# Patient Record
Sex: FEMALE | Race: WHITE | NOT HISPANIC OR LATINO | Employment: FULL TIME | URBAN - METROPOLITAN AREA
[De-identification: names, ages, dates, MRNs, and addresses within clinical notes are randomized per-mention and may not be internally consistent; named-entity substitution may affect disease eponyms.]

---

## 2015-10-20 LAB — HM PAP SMEAR: NORMAL

## 2021-11-12 NOTE — PROGRESS NOTES
OB CONFIRMATION APPOINTMENT    CC- Pt has had a menstrual irregularity    Chief Complaint   Patient presents with   • Amenorrhea        HISTORY OF PRESENT ILLNESS:    STEFANIE Cortés is being seen today to confirm she is pregnant.    She is a 31 y.o.  Patient's last menstrual period was 2021 (exact date)..  EDC= 22.  Gestational age is 11wks     Current obstetric complaints : nausea     Prior obstetric issues, potential pregnancy concerns: subutex maintenance therapy    Family history of genetic issues (includes FOB): none    OFFERED GENETIC TESTING: CfDNA, Cystic fibrosis, Spinal muscular atrophy, Fragile X syndrome: yes    Prior infections concerning in pregnancy (Rash, fever in last 2 weeks): none    HISTORY REVIEWED:      Past Medical History:   Diagnosis Date   • HPV (human papilloma virus) infection 2008   • LGSIL on Pap smear of cervix 2008   • Ovarian cyst        History reviewed. No pertinent surgical history.      Current Outpatient Medications:   •  buprenorphine (SUBUTEX) 8 MG sublingual tablet SL tablet, TAKE 2 TABLET BELOW THE TONGUE DAILY (DISSOLVE UNDER TONGUE-DO NOT SWALLOW), Disp: , Rfl:   •  gabapentin (NEURONTIN) 800 MG tablet, Take 800 mg by mouth 3 (Three) Times a Day., Disp: , Rfl:   •  SUMAtriptan (IMITREX) 50 MG tablet, Take 50 mg by mouth., Disp: , Rfl:   •  amoxicillin-clavulanate (Augmentin) 875-125 MG per tablet, Take 1 tablet by mouth 2 (Two) Times a Day., Disp: 20 tablet, Rfl: 0  •  buprenorphine-naloxone (SUBOXONE) 8-2 MG per SL tablet, TAKE 2 TABLET BELOW THE TONGUE DAILY (DISSOLVE UNDER TONGUE-DO NOT SWALLOW), Disp: , Rfl:   •  cyclobenzaprine (FLEXERIL) 10 MG tablet, Take 10 mg by mouth., Disp: , Rfl:   •  cyclobenzaprine (FLEXERIL) 10 MG tablet, Take 10 mg by mouth., Disp: , Rfl:   •  ibuprofen (ADVIL,MOTRIN) 400 MG tablet, Take 400 mg by mouth., Disp: , Rfl:     Allergies   Allergen Reactions   • Benadryl [Diphenhydramine]        Social History  "    Socioeconomic History   • Marital status:    Tobacco Use   • Smoking status: Current Every Day Smoker     Packs/day: 1.00   • Smokeless tobacco: Never Used   Vaping Use   • Vaping Use: Never used   Substance and Sexual Activity   • Alcohol use: No   • Drug use: No   • Sexual activity: Yes     Partners: Male       Family History   Problem Relation Age of Onset   • Hypertension Father        REVIEW OF SYSTEMS:     Review of Systems   Constitutional: Negative.    HENT: Negative.    Respiratory: Negative.    Cardiovascular: Negative.    Gastrointestinal: Negative.    Endocrine: Negative.    Genitourinary: Positive for menstrual problem.   Musculoskeletal: Negative.    Skin: Negative.    Allergic/Immunologic: Negative.    Neurological: Negative.    Hematological: Negative.    Psychiatric/Behavioral: Negative.        Physical Exam     Physical Exam  Vitals and nursing note reviewed.   Constitutional:       Appearance: She is well-developed.   HENT:      Head: Normocephalic and atraumatic.   Cardiovascular:      Rate and Rhythm: Normal rate.   Pulmonary:      Effort: Pulmonary effort is normal.   Abdominal:      General: There is no distension.      Palpations: Abdomen is soft. There is no mass.      Tenderness: There is no abdominal tenderness. There is no guarding.   Genitourinary:     Vagina: No vaginal discharge.   Musculoskeletal:         General: No tenderness or deformity. Normal range of motion.      Cervical back: Normal range of motion.   Skin:     General: Skin is warm and dry.      Coloration: Skin is not pale.      Findings: No erythema or rash.   Neurological:      Mental Status: She is alert and oriented to person, place, and time.   Psychiatric:         Behavior: Behavior normal.         Thought Content: Thought content normal.         Judgment: Judgment normal.             Objective    /84   Ht 157.5 cm (62.01\")   Wt 52 kg (114 lb 9.6 oz)   LMP 08/29/2021 (Exact Date)   Breastfeeding " No   BMI 20.96 kg/m²     Rema Cortés  reports that she has been smoking. She has been smoking about 1.00 pack per day. She has never used smokeless tobacco.. I have educated her on the risk of diseases from using tobacco products such as cancer, COPD and heart disease.     I advised her to quit : PT COUNSELED TO QUIT SMOKING IN PREGNANCY.  (Cigarette smoking is associated with increased incidence of IUGR, PROM, PTL, PTD, SIDS, asthma, and ear infections.  . Smoking cessation is the single most important thing she can do to improve the pregnancy outcome.)        U/s:  Normal except for ABNORMAL NUCHAL TRANSLUCENCY         Assessment    1) Pregnancy at Unknown   Diagnoses and all orders for this visit:    1. Missed menses (Primary)  -     POC Urinalysis Dipstick  -     POC Pregnancy, Urine    2. Migraine with aura and without status migrainosus, not intractable    3. Pregnancy complicated by subutex maintenance, antepartum (HCC)    4. Cigarette smoker    5. Nuchal fold thickening determined by ultrasound: awaiting nips         Plan    Patient is on Prenatal vitamins  Problem list reviewed and updated.  Reviewed routine prenatal care with the patient  Zika (travel restrictions/ok to use insect repellant), not to changing cat litter, food restrictions, avoidance of alcohol, tobacco and drugs and saunas/hot tubs.   All questions answered.     Counseling was given to patient and family for the following topics: diagnostic results, instructions for management, risk factor reductions, prognosis, patient and family education, impressions, risks and benefits of treatment options and importance of treatment compliance . I spent 45 minutes caring for Rema on this date of service. This time includes time spent by me in the following activities: preparing for the visit, reviewing tests, obtaining and/or reviewing a separately obtained history, performing a medically appropriate examination and/or evaluation, counseling and  educating the patient/family/caregiver, ordering medications, tests, or procedures, referring and communicating with other health care professionals, documenting information in the medical record, independently interpreting results and communicating that information with the patient/family/caregiver, care coordination and subutex maintenance therapy and neurontin use in pregnancy.        RTO Return in about 2 weeks (around 11/29/2021) for ob phys w/ pap.    Imtiaz Campos MD    11/15/2021  12:05 EST

## 2021-11-15 ENCOUNTER — OFFICE VISIT (OUTPATIENT)
Dept: OBSTETRICS AND GYNECOLOGY | Facility: CLINIC | Age: 31
End: 2021-11-15

## 2021-11-15 VITALS
BODY MASS INDEX: 21.09 KG/M2 | SYSTOLIC BLOOD PRESSURE: 112 MMHG | HEIGHT: 62 IN | WEIGHT: 114.6 LBS | DIASTOLIC BLOOD PRESSURE: 84 MMHG

## 2021-11-15 DIAGNOSIS — F17.210 CIGARETTE SMOKER: ICD-10-CM

## 2021-11-15 DIAGNOSIS — N92.6 MISSED MENSES: Primary | ICD-10-CM

## 2021-11-15 DIAGNOSIS — F11.20 PREGNANCY COMPLICATED BY SUBUTEX MAINTENANCE, ANTEPARTUM (HCC): ICD-10-CM

## 2021-11-15 DIAGNOSIS — IMO0002 NUCHAL FOLD THICKENING DETERMINED BY ULTRASOUND: ICD-10-CM

## 2021-11-15 DIAGNOSIS — G43.109 MIGRAINE WITH AURA AND WITHOUT STATUS MIGRAINOSUS, NOT INTRACTABLE: ICD-10-CM

## 2021-11-15 DIAGNOSIS — O99.320 PREGNANCY COMPLICATED BY SUBUTEX MAINTENANCE, ANTEPARTUM (HCC): ICD-10-CM

## 2021-11-15 LAB
B-HCG UR QL: POSITIVE
BILIRUB BLD-MCNC: NEGATIVE MG/DL
CLARITY, POC: CLEAR
COLOR UR: YELLOW
EXPIRATION DATE: ABNORMAL
GLUCOSE UR STRIP-MCNC: NEGATIVE MG/DL
INTERNAL NEGATIVE CONTROL: NEGATIVE
INTERNAL POSITIVE CONTROL: POSITIVE
KETONES UR QL: NEGATIVE
LEUKOCYTE EST, POC: NEGATIVE
Lab: 55
NITRITE UR-MCNC: NEGATIVE MG/ML
PH UR: 5 [PH] (ref 5–8)
PROT UR STRIP-MCNC: NEGATIVE MG/DL
RBC # UR STRIP: NEGATIVE /UL
SP GR UR: 1 (ref 1–1.03)
UROBILINOGEN UR QL: NORMAL

## 2021-11-15 PROCEDURE — 99204 OFFICE O/P NEW MOD 45 MIN: CPT | Performed by: OBSTETRICS & GYNECOLOGY

## 2021-11-15 PROCEDURE — 81002 URINALYSIS NONAUTO W/O SCOPE: CPT | Performed by: OBSTETRICS & GYNECOLOGY

## 2021-11-15 PROCEDURE — 81025 URINE PREGNANCY TEST: CPT | Performed by: OBSTETRICS & GYNECOLOGY

## 2021-11-15 RX ORDER — BUPRENORPHINE HYDROCHLORIDE 8 MG/1
TABLET SUBLINGUAL
COMMUNITY
Start: 2021-11-09

## 2021-11-15 RX ORDER — CYCLOBENZAPRINE HCL 10 MG
10 TABLET ORAL
COMMUNITY
Start: 2021-09-08 | End: 2021-12-03

## 2021-12-03 ENCOUNTER — ROUTINE PRENATAL (OUTPATIENT)
Dept: OBSTETRICS AND GYNECOLOGY | Facility: CLINIC | Age: 31
End: 2021-12-03

## 2021-12-03 VITALS — BODY MASS INDEX: 20.92 KG/M2 | SYSTOLIC BLOOD PRESSURE: 114 MMHG | DIASTOLIC BLOOD PRESSURE: 82 MMHG | WEIGHT: 114.4 LBS

## 2021-12-03 DIAGNOSIS — IMO0002 NUCHAL FOLD THICKENING DETERMINED BY ULTRASOUND: ICD-10-CM

## 2021-12-03 DIAGNOSIS — Z11.51 SCREENING FOR HUMAN PAPILLOMAVIRUS: ICD-10-CM

## 2021-12-03 DIAGNOSIS — Z01.419 PAP SMEAR, LOW-RISK: ICD-10-CM

## 2021-12-03 DIAGNOSIS — Z36.9 ENCOUNTER FOR ANTENATAL SCREENING, UNSPECIFIED: Primary | ICD-10-CM

## 2021-12-03 LAB
C TRACH RRNA SPEC DONR QL NAA+PROBE: NORMAL
EXTERNAL CYSTIC FIBROSIS: NORMAL
GLUCOSE UR STRIP-MCNC: NEGATIVE MG/DL
N GONORRHOEA DNA SPEC QL NAA+PROBE: NORMAL
PROT UR STRIP-MCNC: NEGATIVE MG/DL

## 2021-12-03 PROCEDURE — 0501F PRENATAL FLOW SHEET: CPT | Performed by: NURSE PRACTITIONER

## 2021-12-03 NOTE — PROGRESS NOTES
Initial ob visit     Chief Complaint   Patient presents with   • Initial Prenatal Visit       Rema Cortés is being seen today for her first obstetrical visit.  She is a 31 y.o.  @ 13w5d gestation. This problem is new to me: yes      # 1 - Date: None, Sex: None, Weight: None, GA: None, Delivery: None, Apgar1: None, Apgar5: None, Living: None, Birth Comments: None      LNMP: 21  Confident with date: Yes  Taking prenatal vitamins: Yes  Planned pregnancy: Yes  Prior obstetric issues, potential pregnancy concerns: first pregnancy   Family history of genetic issues (includes FOB): denies  Varicella Hx: as child  Flu vaccine: 3/21   COVID Vaccine: S/P COVID vaccine + booster  History of STDs: denies    Current medications: PNV, Subutex- 8mg BID,   Last pap smear: uncertain   Smoker: Yes, Approx 1 ppd but has cut down to 1/2 ppd   Drug or alcohol abuse: Yes  H/O Physical, mental or sexual abuse: denies    H/O mental health disorder: H/O depressed and bipolar   Prior testing for Cystic Fibrosis Carrier or Sickle Cell Trait- has not had   Prepregnancy BMI: Body mass index is 20.92 kg/m².      Past Medical History:   Diagnosis Date   • HPV (human papilloma virus) infection 2008   • LGSIL on Pap smear of cervix 2008   • Ovarian cyst        No past surgical history on file.      Current Outpatient Medications:   •  buprenorphine (SUBUTEX) 8 MG sublingual tablet SL tablet, TAKE 2 TABLET BELOW THE TONGUE DAILY (DISSOLVE UNDER TONGUE-DO NOT SWALLOW), Disp: , Rfl:   •  gabapentin (NEURONTIN) 800 MG tablet, Take 800 mg by mouth 3 (Three) Times a Day., Disp: , Rfl:   •  SUMAtriptan (IMITREX) 50 MG tablet, Take 50 mg by mouth., Disp: , Rfl:     Allergies   Allergen Reactions   • Benadryl [Diphenhydramine]        Social History     Socioeconomic History   • Marital status:    Tobacco Use   • Smoking status: Current Every Day Smoker     Packs/day: 1.00   • Smokeless tobacco: Never Used   Vaping Use   •  Vaping Use: Never used   Substance and Sexual Activity   • Alcohol use: No   • Drug use: No   • Sexual activity: Yes     Partners: Male       Family History   Problem Relation Age of Onset   • Hypertension Father        Review of systems     All other systems reviewed and are negative except for: Constitutional: positive for fatigue     Objective    /82   Wt 51.9 kg (114 lb 6.4 oz)   LMP 08/29/2021 (Exact Date)   BMI 20.92 kg/m²       General Appearance:    Alert, cooperative, in no acute distress, habitus normal    Head:    Not examined   Eyes:           Not examined   Ears:  Not examined       Neck:  No thyroid enlargement or nodules present   Back:     No kyphosis present, no scoliosis present,                       Lungs:     Clear to auscultation,respirations regular, even and                   unlabored    Heart:    Regular rhythm and normal rate, normal S1 and S2, no            murmur, no gallop, no rub, no click   Breast Exam:    No masses, No nipple discharge   Abdomen:     Normal bowel sounds, no masses, no organomegaly, soft        non-tender, non-distended, no guarding, no rebound                 tenderness   Genitalia:    Vulva - No masses, no atrophy, no lesions    Vagina - No discharge, No bleeding    Cervix - No Lesions, closed. Pap collected:Yes     Uterus - Consistent with 13 weeks.     Adnexa - No masses, non tender       Extremities:   Moves all extremities well, no edema, no cyanosis, no              redness       Skin:   No bleeding, bruising or rash       Neurologic:   Sensation intact, A&O times 3      Assessment/Plan    1) Pregnancy at 13w5d- US IMP: Single, viable IUP @ 13.5 weeks. Nuchal thickeness 0.2cm.  US findings discussed with patient. EDC established 6/5/21 and confirmed by US and LNMP.     2) OB exam: OB exam completed: Yes. New OB bag provided Yes. Pap collected: Yes.    3) Labs: OB labs collected: Yes Counseled on genetic screening: Yes, she desires CF, SMA, and FX.  Counseled on Quad screen and AFP: No, she is too early for AFP. Counseled on NIPS: Yes, she desires NIPS.      4) Patient's Body mass index is 20.92 kg/m². indicating that she is within normal range (BMI 18.5-24.9). No BMI management plan needed. For women with a normal weight, with a BMI between 18.5-24.5 before pregnancy, the recommended weight gain is 25-35 pounds for the entire pregnancy       5)  Prenatal care: Oriented to the office and prenatal care. Encourage prenatal vitamins. Disc Tylenol products are fine, avoid aspirin and ibuprofen; Zika (travel restrictions/ok to use insect repellant); not to change cat litter; food restrictions; exercise;  avoidance of alcohol, tobacco, drugs and saunas/hot tubs.     6) COVID19 precautions were reviewed with the patient. Continue to encourage social distancing, wearing a mask, and good hand hygiene.  I wore a mask, protective eye wear, and gloves during this patient encounter.  Patient also wearing a surgical mask and social distancing was observed. Hand hygeine performed before and after seeing the patient. Info provided on Covid vaccine: s/p covid vaccine and booster     7) Flu vaccine- Encouraged the flu vaccine during pregnancy. Discuss normal physiological changes during pregnancy increase the susceptibility of the flu virus and increase the risk of severe illness for the pregnant woman. Disc flu can be harmful to the unborn baby as well. Enc the flu vaccine. Disc with patient that getting the flu vaccine is the first and most important step in protecting against the flu. Flu vaccines given during pregnancy help protect both the mother and the baby. Getting the flu vaccine during pregnancy also helps protect the  from flu illness for several months after their birth, when then are too young to get vaccinated. Also disc the importance of good hand hygiene and avoiding people who are sick. The patient is undecided  the flu vaccine.     8) Thickened nuchal  fold- Check NIPS today. Nuchal translucency 0.2cm today compared to 0.28 @ 6 weeks     9) Subutex- Prescribed 8 mg BID. Managed by Dr. Puckett. Disc MAGNUS and prolonged stay     10)Smoker- Approx < 1/2 ppd.  During this visit, approx 3-5 minutes counseling the patient regarding smoking cessation. PT COUNSELED TO QUIT SMOKING IN PREGNANCY.  She has been informed that cigarette smoking is associated with increased incidence of  birth, growth restriction, SIDS, et. Disc that smoking cessation is the single most important thing she can do to improve the pregnancy outcome.  She verbalized understanding to this discussion.      11) H/O depression and bipolar- No current meds.      All questions answered.     RTO 4 weeks     Leticia Rao, MARK  12/3/2021  11:43 EST

## 2021-12-04 LAB
ABO GROUP BLD: NORMAL
BASOPHILS # BLD AUTO: 0 X10E3/UL (ref 0–0.2)
BASOPHILS NFR BLD AUTO: 0 %
BLD GP AB SCN SERPL QL: NEGATIVE
EOSINOPHIL # BLD AUTO: 0.1 X10E3/UL (ref 0–0.4)
EOSINOPHIL NFR BLD AUTO: 1 %
ERYTHROCYTE [DISTWIDTH] IN BLOOD BY AUTOMATED COUNT: 12.9 % (ref 11.7–15.4)
HBA1C MFR BLD: 5.2 % (ref 4.8–5.6)
HBV SURFACE AG SERPL QL IA: NEGATIVE
HCT VFR BLD AUTO: 38.4 % (ref 34–46.6)
HCV AB S/CO SERPL IA: <0.1 S/CO RATIO (ref 0–0.9)
HGB BLD-MCNC: 12.8 G/DL (ref 11.1–15.9)
HIV 1+2 AB+HIV1 P24 AG SERPL QL IA: NON REACTIVE
IMM GRANULOCYTES # BLD AUTO: 0 X10E3/UL (ref 0–0.1)
IMM GRANULOCYTES NFR BLD AUTO: 0 %
LYMPHOCYTES # BLD AUTO: 2.7 X10E3/UL (ref 0.7–3.1)
LYMPHOCYTES NFR BLD AUTO: 30 %
MCH RBC QN AUTO: 30.7 PG (ref 26.6–33)
MCHC RBC AUTO-ENTMCNC: 33.3 G/DL (ref 31.5–35.7)
MCV RBC AUTO: 92 FL (ref 79–97)
MONOCYTES # BLD AUTO: 0.5 X10E3/UL (ref 0.1–0.9)
MONOCYTES NFR BLD AUTO: 5 %
NEUTROPHILS # BLD AUTO: 5.7 X10E3/UL (ref 1.4–7)
NEUTROPHILS NFR BLD AUTO: 64 %
PLATELET # BLD AUTO: 252 X10E3/UL (ref 150–450)
RBC # BLD AUTO: 4.17 X10E6/UL (ref 3.77–5.28)
RH BLD: POSITIVE
RPR SER QL: NON REACTIVE
RUBV IGG SERPL IA-ACNC: 2.07 INDEX
VZV IGG SER IA-ACNC: 530 INDEX
WBC # BLD AUTO: 8.9 X10E3/UL (ref 3.4–10.8)

## 2021-12-07 LAB
C TRACH RRNA CVX QL NAA+PROBE: NEGATIVE
CYTOLOGIST CVX/VAG CYTO: NORMAL
CYTOLOGY CVX/VAG DOC CYTO: NORMAL
CYTOLOGY CVX/VAG DOC THIN PREP: NORMAL
DX ICD CODE: NORMAL
HIV 1 & 2 AB SER-IMP: NORMAL
HPV I/H RISK 4 DNA CVX QL PROBE+SIG AMP: NEGATIVE
N GONORRHOEA RRNA CVX QL NAA+PROBE: NEGATIVE
OTHER STN SPEC: NORMAL
STAT OF ADQ CVX/VAG CYTO-IMP: NORMAL
T VAGINALIS RRNA SPEC QL NAA+PROBE: NEGATIVE

## 2021-12-08 LAB
AMPHETAMINES UR QL SCN: NEGATIVE NG/ML
BACTERIA UR CULT: NORMAL
BACTERIA UR CULT: NORMAL
BARBITURATES UR QL SCN: NEGATIVE NG/ML
BENZODIAZ UR QL SCN: NEGATIVE NG/ML
BZE UR QL SCN: NEGATIVE NG/ML
CANNABINOIDS UR QL SCN: NEGATIVE NG/ML
CREAT UR-MCNC: 109.8 MG/DL (ref 20–300)
LABORATORY COMMENT REPORT: NORMAL
METHADONE UR QL SCN: NEGATIVE NG/ML
OPIATES UR QL SCN: NEGATIVE NG/ML
OXYCODONE+OXYMORPHONE UR QL SCN: NEGATIVE NG/ML
PCP UR QL: NEGATIVE NG/ML
PH UR: 5.7 [PH] (ref 4.5–8.9)
PROPOXYPH UR QL SCN: NEGATIVE NG/ML

## 2021-12-09 LAB
CFDNA.FET/CFDNA.TOTAL SFR FETUS: NORMAL %
CITATION REF LAB TEST: NORMAL
FET 13+18+21+X+Y ANEUP PLAS.CFDNA: NEGATIVE
FET CHR 21 TS PLAS.CFDNA QL: NEGATIVE
FET SEX PLAS.CFDNA DOSAGE CFDNA: NORMAL
FET TS 13 RISK PLAS.CFDNA QL: NEGATIVE
FET TS 18 RISK WBC.DNA+CFDNA QL: NEGATIVE
GA EST FROM CONCEPTION DATE: NORMAL D
GESTATIONAL AGE > 9:: YES
LAB DIRECTOR NAME PROVIDER: NORMAL
LAB DIRECTOR NAME PROVIDER: NORMAL
LABORATORY COMMENT REPORT: NORMAL
LIMITATIONS OF THE TEST: NORMAL
NEGATIVE PREDICTIVE VALUE: NORMAL
NOTE: NORMAL
PERFORMANCE CHARACTERISTICS: NORMAL
POSITIVE PREDICTIVE VALUE: NORMAL
REF LAB TEST METHOD: NORMAL
TEST PERFORMANCE INFO SPEC: NORMAL

## 2021-12-10 LAB
CLINICAL GENETICS COUNSELING NOTE: NORMAL
CLINICAL INFO: NORMAL
CYSTIC FIBROSIS MUTATION 97: NORMAL
ETHNIC BACKGROUND STATED: NORMAL
FMR1 GENE CGG RPT BLD/T QL: NORMAL
GENE DIS ANL CARRIER INTERP-IMP: NORMAL
LAB DIRECTOR NAME PROVIDER: NORMAL
LABORATORY COMMENT REPORT: NORMAL
LABORATORY COMMENT REPORT: NORMAL
REASON FOR REFERRAL (NARRATIVE): NORMAL
REF LAB TEST METHOD: NORMAL
SMN1 GENE MUT ANL BLD/T: NORMAL
SPECIMEN SOURCE: NORMAL
TEST PERFORMANCE INFO SPEC: NORMAL
TEST PERFORMANCE INFO SPEC: NORMAL

## 2021-12-30 ENCOUNTER — ROUTINE PRENATAL (OUTPATIENT)
Dept: OBSTETRICS AND GYNECOLOGY | Facility: CLINIC | Age: 31
End: 2021-12-30

## 2021-12-30 VITALS — WEIGHT: 116.8 LBS | DIASTOLIC BLOOD PRESSURE: 72 MMHG | SYSTOLIC BLOOD PRESSURE: 112 MMHG | BODY MASS INDEX: 21.36 KG/M2

## 2021-12-30 DIAGNOSIS — Z34.92 NORMAL PREGNANCY IN SECOND TRIMESTER: Primary | ICD-10-CM

## 2021-12-30 LAB
AFP INTERP SERPL-IMP: NORMAL
GLUCOSE UR STRIP-MCNC: NEGATIVE MG/DL
PROT UR STRIP-MCNC: NEGATIVE MG/DL

## 2021-12-30 PROCEDURE — 0502F SUBSEQUENT PRENATAL CARE: CPT | Performed by: OBSTETRICS & GYNECOLOGY

## 2022-01-25 ENCOUNTER — ROUTINE PRENATAL (OUTPATIENT)
Dept: OBSTETRICS AND GYNECOLOGY | Facility: CLINIC | Age: 32
End: 2022-01-25

## 2022-01-25 VITALS — SYSTOLIC BLOOD PRESSURE: 130 MMHG | BODY MASS INDEX: 21.94 KG/M2 | DIASTOLIC BLOOD PRESSURE: 86 MMHG | WEIGHT: 120 LBS

## 2022-01-25 DIAGNOSIS — F11.20 PREGNANCY COMPLICATED BY SUBUTEX MAINTENANCE, ANTEPARTUM: ICD-10-CM

## 2022-01-25 DIAGNOSIS — O99.320 PREGNANCY COMPLICATED BY SUBUTEX MAINTENANCE, ANTEPARTUM: ICD-10-CM

## 2022-01-25 DIAGNOSIS — F17.210 CIGARETTE SMOKER: ICD-10-CM

## 2022-01-25 DIAGNOSIS — Z3A.21 21 WEEKS GESTATION OF PREGNANCY: Primary | ICD-10-CM

## 2022-01-25 LAB
GLUCOSE UR STRIP-MCNC: NEGATIVE MG/DL
PROT UR STRIP-MCNC: NEGATIVE MG/DL

## 2022-01-25 PROCEDURE — 0502F SUBSEQUENT PRENATAL CARE: CPT | Performed by: OBSTETRICS & GYNECOLOGY

## 2022-01-25 NOTE — PROGRESS NOTES
OB follow up     Chief Complaint: [facility] FU    Rema Cortés is a 32 y.o.  21w2d being seen today for her obstetrical visit.  Patient reports no complaints. Fetal movement: normal. This is the first time I am seeing this pt. She presents for an US today.    Review of Systems  No bleeding, No cramping/contractions     /86   Wt 54.4 kg (120 lb)   LMP 2021 (Exact Date)   BMI 21.94 kg/m²     FHT:  present   Uterine Size:  21cm           Assessment/Plan: Low risk pregnancy    1) pregnancy at 21w2d: US for Anatomy reveals normal anatomy within limits of US. CL 4.4cm.     2) s/p COVID and booster. Plans to get flu vaccine at [facility].    3) +tobacco: pt has been weaning and has quit!    4) Hx of drug abuse: Been on it since 2016. She is on Subutex 8mg daily. Pt is aware of MAGNUS and baby will need to stay in the hospital for 5-7 days after delivery.     5) Nuchal fold thickening: cfDNA testing negative.    Reviewed this stage of pregnancy  Problem list updated   No follow-ups on file.      Milana Thompson DO    2022  12:53 EST

## 2022-02-23 ENCOUNTER — ROUTINE PRENATAL (OUTPATIENT)
Dept: OBSTETRICS AND GYNECOLOGY | Facility: CLINIC | Age: 32
End: 2022-02-23

## 2022-02-23 VITALS — WEIGHT: 122.6 LBS | DIASTOLIC BLOOD PRESSURE: 62 MMHG | BODY MASS INDEX: 22.42 KG/M2 | SYSTOLIC BLOOD PRESSURE: 112 MMHG

## 2022-02-23 DIAGNOSIS — F17.210 CIGARETTE SMOKER: ICD-10-CM

## 2022-02-23 DIAGNOSIS — F11.20 PREGNANCY COMPLICATED BY SUBUTEX MAINTENANCE, ANTEPARTUM: Primary | ICD-10-CM

## 2022-02-23 DIAGNOSIS — O99.320 PREGNANCY COMPLICATED BY SUBUTEX MAINTENANCE, ANTEPARTUM: Primary | ICD-10-CM

## 2022-02-23 PROBLEM — Z34.90 PREGNANCY: Status: ACTIVE | Noted: 2022-02-23

## 2022-02-23 PROBLEM — N92.6 MISSED MENSES: Status: RESOLVED | Noted: 2021-11-15 | Resolved: 2022-02-23

## 2022-02-23 LAB
EXTERNAL NIPT: NORMAL
GLUCOSE UR STRIP-MCNC: NEGATIVE MG/DL
PROT UR STRIP-MCNC: NEGATIVE MG/DL

## 2022-02-23 PROCEDURE — 0502F SUBSEQUENT PRENATAL CARE: CPT | Performed by: OBSTETRICS & GYNECOLOGY

## 2022-02-23 NOTE — PROGRESS NOTES
OB follow up     Rema Cortés is a 32 y.o.  25w3d being seen today for her obstetrical visit.  Patient reports no complaints. Fetal movement: normal. This is the first time I am seeing this patient in this pregnancy and all of her problems are new to me, the examiner.       Review of Systems  No bleeding, No cramping/contractions     /62   Wt 55.6 kg (122 lb 9.6 oz)   LMP 2021 (Exact Date)   BMI 22.42 kg/m²     FHT: 132 BPM   Uterine Size: 26  cm       Assessment/Plan:    1) 32 y.o.  -pregnancy at 25w3d- doing well.     2) + Nuchal thickening on US- NIPT low risk, pt declined AFP.    3) H/O drug use- pt on Subutex 8 mg QD and sees Dr John Puckett for Rx. MAGNUS and hospital stay d/w pt.  Consider growth US in 3rd trimester.     4) Patient advised to have flu vaccination to help prevent serious flu related complications for her and her unborn child.  The importance of antibodies for  immunity also discussed with patient and she does not agree to vaccination today.   All household contacts were advised to be vaccinated if they are of age. The patient was also encouraged to call for Tamiflu for positive exposures.     5) CF/SMA/FX neg    6) Bipolar disorder- pt not on meds, doing well.     7) Smoker- pt is occ smoking one cigarette. Counseled to stop.     8)I saw the patient with a face mask, gloves and eye protection  The patient herself was masked.  Social distancing was observed as appropriate.    9)Reviewed this stage of pregnancy    10)Problem list updated     11) RTO 3 weeks OBT, 2 hour GTT, RH+ and Tdap.      Delfina Villanueva MD    2022  10:56 EST

## 2022-03-11 ENCOUNTER — ROUTINE PRENATAL (OUTPATIENT)
Dept: OBSTETRICS AND GYNECOLOGY | Facility: CLINIC | Age: 32
End: 2022-03-11

## 2022-03-11 VITALS — BODY MASS INDEX: 22.67 KG/M2 | SYSTOLIC BLOOD PRESSURE: 118 MMHG | WEIGHT: 124 LBS | DIASTOLIC BLOOD PRESSURE: 78 MMHG

## 2022-03-11 DIAGNOSIS — O99.320 PREGNANCY COMPLICATED BY SUBUTEX MAINTENANCE, ANTEPARTUM: ICD-10-CM

## 2022-03-11 DIAGNOSIS — F11.20 PREGNANCY COMPLICATED BY SUBUTEX MAINTENANCE, ANTEPARTUM: ICD-10-CM

## 2022-03-11 DIAGNOSIS — Z34.93 PRENATAL CARE IN THIRD TRIMESTER: ICD-10-CM

## 2022-03-11 DIAGNOSIS — Z36.9 ENCOUNTER FOR ANTENATAL SCREENING, UNSPECIFIED: Primary | ICD-10-CM

## 2022-03-11 LAB
GLUCOSE UR STRIP-MCNC: NEGATIVE MG/DL
PROT UR STRIP-MCNC: NEGATIVE MG/DL

## 2022-03-11 PROCEDURE — 0502F SUBSEQUENT PRENATAL CARE: CPT | Performed by: NURSE PRACTITIONER

## 2022-03-11 NOTE — PROGRESS NOTES
OB follow up     Rema Cortés is a 32 y.o.  27w5d being seen today for her obstetrical visit.  Patient reports no complaints. Fetal movement: normal. She is doing her 2hr GTT today.     Review of Systems  No bleeding, No cramping/contractions     /78   Wt 56.2 kg (124 lb)   LMP 2021 (Exact Date)   BMI 22.67 kg/m²     FHT: 120s  BPM   Uterine Size: 28 cm       Assessment/Plan:    1) 32 y.o.  -pregnancy at 27w5d- doing well. 2hr GTT in progress. Rh +.     2) + Nuchal thickening on US- NIPT low risk, pt declined AFP.    3) H/O drug use- pt on Subutex 8 mg QD and sees Dr John Puckett for Rx. MAGNUS and hospital stay d/w pt.  Consider growth US in 3rd trimester.     4) Declined flu vaccine      5) CF/SMA/FX neg    6) Bipolar disorder- pt not on meds, doing well.     7) Smoker- pt is occ smoking one cigarette. Counseled to stop.     8) COVID19 precautions were reviewed with the patient. Continue to encourage social distancing, wearing a mask, and good hand hygiene.  I wore a mask, protective eye wear, and gloves during this patient encounter.  Patient also wearing a surgical mask and social distancing was observed. Hand hygeine performed before and after seeing the patient. Info provided on Covid vaccine: S/P Covid vaccine and booster.    9) Tdap vaccine- Disc that all pregnant women should get a Tdap shot in the third trimester, preferably between 27 weeks and 36 weeks of pregnancy. The Tdap shot is an effective and safe way to protect the baby from serious illness and complications of pertussis. Recommend that partners, family members, and infant caregivers should be up to date on theTdap vaccine if they have not previously been vaccinated. Ideally, all family members should be vaccinated at least 2 weeks before coming in contact with the . If not administered during pregnancy, the Tdap vaccine should be given immediately postpartum if the patient is not UTD on Tdap.      10) Peds-  Undecided. Male- desires circ. Breast. Considering epidural.     Reviewed this stage of pregnancy  Problem list updated   RTO 2 weeks OB tummy and growth US     Leticia Rao, APRN  3/11/2022  09:07 EST

## 2022-03-12 LAB
GLUCOSE 1H P 75 G GLC PO SERPL-MCNC: 113 MG/DL (ref 65–179)
GLUCOSE 2H P 75 G GLC PO SERPL-MCNC: 104 MG/DL (ref 65–152)
GLUCOSE P FAST SERPL-MCNC: 71 MG/DL (ref 65–91)
HCT VFR BLD AUTO: 36.6 % (ref 34–46.6)
HGB BLD-MCNC: 12.3 G/DL (ref 11.1–15.9)

## 2022-03-28 ENCOUNTER — ROUTINE PRENATAL (OUTPATIENT)
Dept: OBSTETRICS AND GYNECOLOGY | Facility: CLINIC | Age: 32
End: 2022-03-28

## 2022-03-28 VITALS — SYSTOLIC BLOOD PRESSURE: 120 MMHG | DIASTOLIC BLOOD PRESSURE: 62 MMHG | BODY MASS INDEX: 23.04 KG/M2 | WEIGHT: 126 LBS

## 2022-03-28 DIAGNOSIS — F11.20 PREGNANCY COMPLICATED BY SUBUTEX MAINTENANCE, ANTEPARTUM: Primary | ICD-10-CM

## 2022-03-28 DIAGNOSIS — G43.109 MIGRAINE WITH AURA AND WITHOUT STATUS MIGRAINOSUS, NOT INTRACTABLE: ICD-10-CM

## 2022-03-28 DIAGNOSIS — Z3A.30 30 WEEKS GESTATION OF PREGNANCY: ICD-10-CM

## 2022-03-28 DIAGNOSIS — IMO0002 NUCHAL FOLD THICKENING DETERMINED BY ULTRASOUND: ICD-10-CM

## 2022-03-28 DIAGNOSIS — F17.210 CIGARETTE SMOKER: ICD-10-CM

## 2022-03-28 DIAGNOSIS — O99.320 PREGNANCY COMPLICATED BY SUBUTEX MAINTENANCE, ANTEPARTUM: Primary | ICD-10-CM

## 2022-03-28 LAB
GLUCOSE UR STRIP-MCNC: NEGATIVE MG/DL
PROT UR STRIP-MCNC: NEGATIVE MG/DL

## 2022-03-28 PROCEDURE — 0502F SUBSEQUENT PRENATAL CARE: CPT | Performed by: OBSTETRICS & GYNECOLOGY

## 2022-03-28 NOTE — PROGRESS NOTES
Pt is a 32 y.o. @ 30w1d here for ob check.  I saw the patient with a face mask, gloves and eye protection  The patient herself was masked.  Social distancing was observed as appropriate. All COVID precautions observed.   This stage of pregnancy reviewed.   Pt desires Tdap vaccine  All questions answered  Rema Cortés  reports that she has been smoking. She has been smoking about 1.00 pack per day. She has never used smokeless tobacco.. I have educated her on the risk of diseases from using tobacco products such as cancer, COPD and heart disease.     I advised her to quit: PT COUNSELED TO QUIT SMOKING IN PREGNANCY.  (Cigarette smoking is associated with increased incidence of IUGR, PROM, PTL, PTD, SIDS, asthma, and ear infections.  . Smoking cessation is the single most important thing she can do to improve the pregnancy outcome.)   U/s wnl today.

## 2022-04-18 ENCOUNTER — ROUTINE PRENATAL (OUTPATIENT)
Dept: OBSTETRICS AND GYNECOLOGY | Facility: CLINIC | Age: 32
End: 2022-04-18

## 2022-04-18 VITALS — DIASTOLIC BLOOD PRESSURE: 82 MMHG | WEIGHT: 127.4 LBS | SYSTOLIC BLOOD PRESSURE: 116 MMHG | BODY MASS INDEX: 23.3 KG/M2

## 2022-04-18 DIAGNOSIS — G43.109 MIGRAINE WITH AURA AND WITHOUT STATUS MIGRAINOSUS, NOT INTRACTABLE: ICD-10-CM

## 2022-04-18 DIAGNOSIS — F11.20 PREGNANCY COMPLICATED BY SUBUTEX MAINTENANCE, ANTEPARTUM: ICD-10-CM

## 2022-04-18 DIAGNOSIS — O99.320 PREGNANCY COMPLICATED BY SUBUTEX MAINTENANCE, ANTEPARTUM: ICD-10-CM

## 2022-04-18 DIAGNOSIS — F17.210 CIGARETTE SMOKER: Primary | ICD-10-CM

## 2022-04-18 DIAGNOSIS — Z34.90 PREGNANCY, UNSPECIFIED GESTATIONAL AGE: ICD-10-CM

## 2022-04-18 LAB
GLUCOSE UR STRIP-MCNC: NEGATIVE MG/DL
PROT UR STRIP-MCNC: NEGATIVE MG/DL

## 2022-04-18 PROCEDURE — 0502F SUBSEQUENT PRENATAL CARE: CPT | Performed by: OBSTETRICS & GYNECOLOGY

## 2022-04-18 RX ORDER — CYCLOBENZAPRINE HCL 10 MG
TABLET ORAL
COMMUNITY
Start: 2022-04-11

## 2022-04-18 NOTE — PROGRESS NOTES
Pt is a 32 y.o. @33w1d here for ob check.  No complaints.  I saw the patient with a face mask, gloves and eye protection  The patient herself was masked.  Social distancing was observed as appropriate. All COVID precautions observed.   Pt without complaints.  This stage of pregnancy reviewed.   Pt needs tdap vaccine

## 2022-05-09 ENCOUNTER — ROUTINE PRENATAL (OUTPATIENT)
Dept: OBSTETRICS AND GYNECOLOGY | Facility: CLINIC | Age: 32
End: 2022-05-09

## 2022-05-09 VITALS — SYSTOLIC BLOOD PRESSURE: 132 MMHG | WEIGHT: 128 LBS | DIASTOLIC BLOOD PRESSURE: 82 MMHG | BODY MASS INDEX: 23.41 KG/M2

## 2022-05-09 DIAGNOSIS — F17.210 CIGARETTE SMOKER: ICD-10-CM

## 2022-05-09 DIAGNOSIS — F11.20 PREGNANCY COMPLICATED BY SUBUTEX MAINTENANCE, ANTEPARTUM: ICD-10-CM

## 2022-05-09 DIAGNOSIS — Z36.85 ENCOUNTER FOR ANTENATAL SCREENING FOR STREPTOCOCCUS B: Primary | ICD-10-CM

## 2022-05-09 DIAGNOSIS — G43.109 MIGRAINE WITH AURA AND WITHOUT STATUS MIGRAINOSUS, NOT INTRACTABLE: ICD-10-CM

## 2022-05-09 DIAGNOSIS — O99.320 PREGNANCY COMPLICATED BY SUBUTEX MAINTENANCE, ANTEPARTUM: ICD-10-CM

## 2022-05-09 DIAGNOSIS — IMO0002 NUCHAL FOLD THICKENING DETERMINED BY ULTRASOUND: ICD-10-CM

## 2022-05-09 DIAGNOSIS — O26.843 SIZE OF FETUS INCONSISTENT WITH DATES IN THIRD TRIMESTER: ICD-10-CM

## 2022-05-09 LAB
GLUCOSE UR STRIP-MCNC: NEGATIVE MG/DL
PROT UR STRIP-MCNC: NEGATIVE MG/DL

## 2022-05-09 PROCEDURE — 0502F SUBSEQUENT PRENATAL CARE: CPT | Performed by: OBSTETRICS & GYNECOLOGY

## 2022-05-09 NOTE — PROGRESS NOTES
Pt here for ob internal and labor warnings. Pt states she received the Tdap vaccine here on . Pt is a 32 y.o. @36w1d.  Pt on subutex.     Cx not checked today.    FH < GA. Will check u/s next visit.   I saw the patient with a face mask, gloves and eye protection  The patient herself was masked.  Social distancing was observed as appropriate. All COVID precautions observed.   Labor warnings reviewed.  GBS obtained.

## 2022-05-11 LAB — GP B STREP DNA SPEC QL NAA+PROBE: NEGATIVE

## 2022-05-17 ENCOUNTER — ROUTINE PRENATAL (OUTPATIENT)
Dept: OBSTETRICS AND GYNECOLOGY | Facility: CLINIC | Age: 32
End: 2022-05-17

## 2022-05-17 VITALS — SYSTOLIC BLOOD PRESSURE: 140 MMHG | WEIGHT: 129.6 LBS | BODY MASS INDEX: 23.7 KG/M2 | DIASTOLIC BLOOD PRESSURE: 92 MMHG

## 2022-05-17 DIAGNOSIS — O16.3 ELEVATED BLOOD PRESSURE AFFECTING PREGNANCY IN THIRD TRIMESTER, ANTEPARTUM: ICD-10-CM

## 2022-05-17 DIAGNOSIS — Z3A.37 37 WEEKS GESTATION OF PREGNANCY: Primary | ICD-10-CM

## 2022-05-17 DIAGNOSIS — F11.20 PREGNANCY COMPLICATED BY SUBUTEX MAINTENANCE, ANTEPARTUM: ICD-10-CM

## 2022-05-17 DIAGNOSIS — O99.320 PREGNANCY COMPLICATED BY SUBUTEX MAINTENANCE, ANTEPARTUM: ICD-10-CM

## 2022-05-17 LAB
GLUCOSE UR STRIP-MCNC: NEGATIVE MG/DL
PROT UR STRIP-MCNC: NEGATIVE MG/DL

## 2022-05-17 PROCEDURE — 0502F SUBSEQUENT PRENATAL CARE: CPT | Performed by: OBSTETRICS & GYNECOLOGY

## 2022-05-17 NOTE — PROGRESS NOTES
OB follow up     Chief Complaint: PNC FU    Rema Cortés is a 32 y.o.  37w2d being seen today for her obstetrical visit.  Patient reports no complaints. Fetal movement: normal. Pt denies HA's, blurry vision, swelling.     Review of Systems  No bleeding, No cramping/contractions     /92   Wt 58.8 kg (129 lb 9.6 oz)   LMP 2021 (Exact Date)   BMI 23.70 kg/m²     FHT:   present   Uterine Size:          SVE closed     Assessment/Plan: Low risk pregnancy    1) pregnancy at 37w2d: GBBS negative     2) Elevated BP: Asymptomatic. Check pre labs. Collect 24hr urine collection. FU in 1 week for NST/BPP. US today with EFW 48% and MARIA EUGENIA 21cm. Pt to check her BP at home.    3) H/O drug use- pt on Subutex 8 mg QD and sees Dr John Puckett for Rx. MAGNUS and hospital stay d/w pt.       4) Declined flu vaccine. S/p tDap vaccine     5) CF/SMA/FX neg     6) Bipolar disorder- pt not on meds, doing well.      7) Smoker- pt is occ smoking one cigarette. Counseled to stop.         Reviewed this stage of pregnancy  Problem list updated   No follow-ups on file.      Milana Thompson DO    2022  12:41 EDT

## 2022-05-18 LAB
ALBUMIN SERPL-MCNC: 3.7 G/DL (ref 3.8–4.8)
ALBUMIN/GLOB SERPL: 1.5 {RATIO} (ref 1.2–2.2)
ALP SERPL-CCNC: 164 IU/L (ref 44–121)
ALT SERPL-CCNC: 11 IU/L (ref 0–32)
AST SERPL-CCNC: 21 IU/L (ref 0–40)
BILIRUB SERPL-MCNC: <0.2 MG/DL (ref 0–1.2)
BUN SERPL-MCNC: 10 MG/DL (ref 6–20)
BUN/CREAT SERPL: 16 (ref 9–23)
CALCIUM SERPL-MCNC: 9.4 MG/DL (ref 8.7–10.2)
CHLORIDE SERPL-SCNC: 106 MMOL/L (ref 96–106)
CO2 SERPL-SCNC: 18 MMOL/L (ref 20–29)
CREAT SERPL-MCNC: 0.62 MG/DL (ref 0.57–1)
EGFRCR SERPLBLD CKD-EPI 2021: 120 ML/MIN/1.73
ERYTHROCYTE [DISTWIDTH] IN BLOOD BY AUTOMATED COUNT: 13.1 % (ref 11.7–15.4)
GLOBULIN SER CALC-MCNC: 2.4 G/DL (ref 1.5–4.5)
GLUCOSE SERPL-MCNC: 78 MG/DL (ref 65–99)
HCT VFR BLD AUTO: 42.4 % (ref 34–46.6)
HGB BLD-MCNC: 14.4 G/DL (ref 11.1–15.9)
MCH RBC QN AUTO: 31 PG (ref 26.6–33)
MCHC RBC AUTO-ENTMCNC: 34 G/DL (ref 31.5–35.7)
MCV RBC AUTO: 91 FL (ref 79–97)
PLATELET # BLD AUTO: 247 X10E3/UL (ref 150–450)
POTASSIUM SERPL-SCNC: 4.5 MMOL/L (ref 3.5–5.2)
PROT SERPL-MCNC: 6.1 G/DL (ref 6–8.5)
RBC # BLD AUTO: 4.65 X10E6/UL (ref 3.77–5.28)
SODIUM SERPL-SCNC: 139 MMOL/L (ref 134–144)
WBC # BLD AUTO: 12.5 X10E3/UL (ref 3.4–10.8)

## 2022-05-26 ENCOUNTER — ROUTINE PRENATAL (OUTPATIENT)
Dept: OBSTETRICS AND GYNECOLOGY | Facility: CLINIC | Age: 32
End: 2022-05-26
Payer: COMMERCIAL

## 2022-05-26 VITALS — BODY MASS INDEX: 23.7 KG/M2 | WEIGHT: 129.6 LBS | SYSTOLIC BLOOD PRESSURE: 132 MMHG | DIASTOLIC BLOOD PRESSURE: 84 MMHG

## 2022-05-26 DIAGNOSIS — F11.20 PREGNANCY COMPLICATED BY SUBUTEX MAINTENANCE, ANTEPARTUM: Primary | ICD-10-CM

## 2022-05-26 DIAGNOSIS — Z36.9 ENCOUNTER FOR ANTENATAL SCREENING, UNSPECIFIED: ICD-10-CM

## 2022-05-26 DIAGNOSIS — O99.320 PREGNANCY COMPLICATED BY SUBUTEX MAINTENANCE, ANTEPARTUM: Primary | ICD-10-CM

## 2022-05-26 LAB
GLUCOSE UR STRIP-MCNC: NEGATIVE MG/DL
PROT UR STRIP-MCNC: NEGATIVE MG/DL

## 2022-05-26 PROCEDURE — 0502F SUBSEQUENT PRENATAL CARE: CPT | Performed by: OBSTETRICS & GYNECOLOGY

## 2022-05-27 LAB
PROT 24H UR-MRATE: 88 MG/24 HR (ref 30–150)
PROT UR-MCNC: 7 MG/DL

## 2022-06-03 ENCOUNTER — ROUTINE PRENATAL (OUTPATIENT)
Dept: OBSTETRICS AND GYNECOLOGY | Facility: CLINIC | Age: 32
End: 2022-06-03

## 2022-06-03 VITALS — SYSTOLIC BLOOD PRESSURE: 132 MMHG | DIASTOLIC BLOOD PRESSURE: 82 MMHG | BODY MASS INDEX: 24.17 KG/M2 | WEIGHT: 132.2 LBS

## 2022-06-03 DIAGNOSIS — Z34.93 PRENATAL CARE IN THIRD TRIMESTER: Primary | ICD-10-CM

## 2022-06-03 LAB
GLUCOSE UR STRIP-MCNC: NEGATIVE MG/DL
PROT UR STRIP-MCNC: NEGATIVE MG/DL

## 2022-06-03 PROCEDURE — 0502F SUBSEQUENT PRENATAL CARE: CPT | Performed by: NURSE PRACTITIONER

## 2022-06-03 NOTE — PROGRESS NOTES
OB follow up     Chief Complaint: [facility] ALIDA Cortés is a 32 y.o.  39w5d being seen today for her obstetrical visit.  Patient reports no complaints. Fetal movement: normal.       Review of Systems  No bleeding, No cramping/contractions     /82   Wt 60 kg (132 lb 3.2 oz)   LMP 2021 (Exact Date)   BMI 24.17 kg/m²     FHT:   present   Uterine Size:   S=D        SVE 0/50/-3, posterior, firm     Assessment/Plan: Low risk pregnancy    1) pregnancy at 39w5d: GBBS negative. US IMP: BPP 8/8. MARIA EUGENIA 21cm.  VTX.    2) Elevated BP: Asymptomatic.  24 hr urine= 88. Normal CBC and CMP for pregnancy.     3) H/O drug use- pt on Subutex 8 mg QD and sees Dr John Puckett for Rx. MAGNUS and hospital stay d/w pt.  Growth 48% @ 37 weeks.      4) Declined flu vaccine. S/p tDap vaccine     5) CF/SMA/FX neg     6) Bipolar disorder- pt not on meds, doing well.      7) Smoker- pt is occ smoking one cigarette. Counseled to stop.     8) IOL- Disc R/B/A of IOL. Disc unfavorable cervix and the need for cervical ripening. Disc risk of prolonged IOL d/t unfavorable cervix.     Reviewed this stage of pregnancy  Problem list updated   Schedule IOL for next week     Leticia Rao, MARK  6/3/2022  09:38 EDT

## 2022-06-08 ENCOUNTER — ANESTHESIA (OUTPATIENT)
Dept: OBSTETRICS AND GYNECOLOGY | Facility: HOSPITAL | Age: 32
End: 2022-06-08

## 2022-06-08 ENCOUNTER — HOSPITAL ENCOUNTER (OUTPATIENT)
Dept: LABOR AND DELIVERY | Facility: HOSPITAL | Age: 32
Discharge: HOME | End: 2022-06-08

## 2022-06-08 ENCOUNTER — ANESTHESIA EVENT (OUTPATIENT)
Dept: OBSTETRICS AND GYNECOLOGY | Facility: HOSPITAL | Age: 32
End: 2022-06-08

## 2022-06-08 ENCOUNTER — HOSPITAL ENCOUNTER (INPATIENT)
Facility: HOSPITAL | Age: 32
LOS: 3 days | Discharge: HOME | End: 2022-06-11
Attending: OBSTETRICS & GYNECOLOGY | Admitting: OBSTETRICS & GYNECOLOGY

## 2022-06-08 DIAGNOSIS — Z3A.40 40 WEEKS GESTATION OF PREGNANCY: Primary | ICD-10-CM

## 2022-06-08 PROBLEM — Z34.90 PREGNANT: Status: ACTIVE | Noted: 2022-06-08

## 2022-06-08 LAB
A1 MICROGLOB PLACENTAL VAG QL: POSITIVE
ABO GROUP BLD: NORMAL
ABO GROUP BLD: NORMAL
AMPHET+METHAMPHET UR QL: NEGATIVE
AMPHETAMINES UR QL: NEGATIVE
BARBITURATES UR QL SCN: NEGATIVE
BENZODIAZ UR QL SCN: NEGATIVE
BLD GP AB SCN SERPL QL: NEGATIVE
BUPRENORPHINE SERPL-MCNC: POSITIVE NG/ML
CANNABINOIDS SERPL QL: NEGATIVE
COCAINE UR QL: NEGATIVE
DEPRECATED RDW RBC AUTO: 47.5 FL (ref 37–54)
ERYTHROCYTE [DISTWIDTH] IN BLOOD BY AUTOMATED COUNT: 13.9 % (ref 12.3–15.4)
HCT VFR BLD AUTO: 41.9 % (ref 34–46.6)
HGB BLD-MCNC: 14.2 G/DL (ref 12–15.9)
MCH RBC QN AUTO: 31.3 PG (ref 26.6–33)
MCHC RBC AUTO-ENTMCNC: 33.9 G/DL (ref 31.5–35.7)
MCV RBC AUTO: 92.3 FL (ref 79–97)
METHADONE UR QL SCN: NEGATIVE
OPIATES UR QL: NEGATIVE
OXYCODONE UR QL SCN: NEGATIVE
PCP UR QL SCN: NEGATIVE
PLATELET # BLD AUTO: 200 10*3/MM3 (ref 140–450)
PMV BLD AUTO: 10.6 FL (ref 6–12)
PROPOXYPH UR QL: NEGATIVE
RBC # BLD AUTO: 4.54 10*6/MM3 (ref 3.77–5.28)
RH BLD: POSITIVE
RH BLD: POSITIVE
SARS-COV-2 RNA PNL SPEC NAA+PROBE: NOT DETECTED
T&S EXPIRATION DATE: NORMAL
TRICYCLICS UR QL SCN: NEGATIVE
WBC NRBC COR # BLD: 11.44 10*3/MM3 (ref 3.4–10.8)

## 2022-06-08 PROCEDURE — 80306 DRUG TEST PRSMV INSTRMNT: CPT | Performed by: OBSTETRICS & GYNECOLOGY

## 2022-06-08 PROCEDURE — 86850 RBC ANTIBODY SCREEN: CPT | Performed by: OBSTETRICS & GYNECOLOGY

## 2022-06-08 PROCEDURE — C1755 CATHETER, INTRASPINAL: HCPCS | Performed by: ANESTHESIOLOGY

## 2022-06-08 PROCEDURE — 87635 SARS-COV-2 COVID-19 AMP PRB: CPT | Performed by: OBSTETRICS & GYNECOLOGY

## 2022-06-08 PROCEDURE — 86900 BLOOD TYPING SEROLOGIC ABO: CPT

## 2022-06-08 PROCEDURE — 86900 BLOOD TYPING SEROLOGIC ABO: CPT | Performed by: OBSTETRICS & GYNECOLOGY

## 2022-06-08 PROCEDURE — 85027 COMPLETE CBC AUTOMATED: CPT | Performed by: OBSTETRICS & GYNECOLOGY

## 2022-06-08 PROCEDURE — 86901 BLOOD TYPING SEROLOGIC RH(D): CPT | Performed by: OBSTETRICS & GYNECOLOGY

## 2022-06-08 PROCEDURE — 84112 EVAL AMNIOTIC FLUID PROTEIN: CPT | Performed by: OBSTETRICS & GYNECOLOGY

## 2022-06-08 PROCEDURE — 86901 BLOOD TYPING SEROLOGIC RH(D): CPT

## 2022-06-08 PROCEDURE — 25010000002 FENTANYL CITRATE (PF) 50 MCG/ML SOLUTION: Performed by: OBSTETRICS & GYNECOLOGY

## 2022-06-08 RX ORDER — OXYTOCIN/0.9 % SODIUM CHLORIDE 30/500 ML
PLASTIC BAG, INJECTION (ML) INTRAVENOUS
Status: COMPLETED
Start: 2022-06-08 | End: 2022-06-08

## 2022-06-08 RX ORDER — PROMETHAZINE HYDROCHLORIDE 25 MG/1
12.5 TABLET ORAL EVERY 6 HOURS PRN
Status: DISCONTINUED | OUTPATIENT
Start: 2022-06-08 | End: 2022-06-09

## 2022-06-08 RX ORDER — ACETAMINOPHEN 325 MG/1
650 TABLET ORAL EVERY 4 HOURS PRN
Status: DISCONTINUED | OUTPATIENT
Start: 2022-06-08 | End: 2022-06-09

## 2022-06-08 RX ORDER — SODIUM CHLORIDE 0.9 % (FLUSH) 0.9 %
10 SYRINGE (ML) INJECTION EVERY 12 HOURS SCHEDULED
Status: DISCONTINUED | OUTPATIENT
Start: 2022-06-08 | End: 2022-06-09

## 2022-06-08 RX ORDER — TERBUTALINE SULFATE 1 MG/ML
0.25 INJECTION, SOLUTION SUBCUTANEOUS AS NEEDED
Status: DISCONTINUED | OUTPATIENT
Start: 2022-06-08 | End: 2022-06-09

## 2022-06-08 RX ORDER — SODIUM CHLORIDE 0.9 % (FLUSH) 0.9 %
10 SYRINGE (ML) INJECTION AS NEEDED
Status: DISCONTINUED | OUTPATIENT
Start: 2022-06-08 | End: 2022-06-09

## 2022-06-08 RX ORDER — SODIUM CHLORIDE, SODIUM LACTATE, POTASSIUM CHLORIDE, CALCIUM CHLORIDE 600; 310; 30; 20 MG/100ML; MG/100ML; MG/100ML; MG/100ML
125 INJECTION, SOLUTION INTRAVENOUS CONTINUOUS
Status: DISCONTINUED | OUTPATIENT
Start: 2022-06-08 | End: 2022-06-09

## 2022-06-08 RX ORDER — FENTANYL 0.2 MG/100ML-BUPIV 0.125%-NACL 0.9% EPIDURAL INJ 2/0.125 MCG/ML-%
SOLUTION INJECTION
Status: COMPLETED
Start: 2022-06-08 | End: 2022-06-08

## 2022-06-08 RX ORDER — PROMETHAZINE HYDROCHLORIDE 25 MG/1
12.5 SUPPOSITORY RECTAL EVERY 6 HOURS PRN
Status: DISCONTINUED | OUTPATIENT
Start: 2022-06-08 | End: 2022-06-09

## 2022-06-08 RX ORDER — EPHEDRINE SULFATE 50 MG/ML
10 INJECTION, SOLUTION INTRAVENOUS
Status: DISCONTINUED | OUTPATIENT
Start: 2022-06-08 | End: 2022-06-09

## 2022-06-08 RX ORDER — LANOLIN ALCOHOL/MO/W.PET/CERES
50 CREAM (GRAM) TOPICAL DAILY
COMMUNITY

## 2022-06-08 RX ORDER — FENTANYL 0.2 MG/100ML-BUPIV 0.125%-NACL 0.9% EPIDURAL INJ 2/0.125 MCG/ML-%
SOLUTION INJECTION CONTINUOUS
Status: DISCONTINUED | OUTPATIENT
Start: 2022-06-08 | End: 2022-06-09

## 2022-06-08 RX ORDER — LIDOCAINE HYDROCHLORIDE AND EPINEPHRINE 15; 5 MG/ML; UG/ML
INJECTION, SOLUTION EPIDURAL AS NEEDED
Status: DISCONTINUED | OUTPATIENT
Start: 2022-06-08 | End: 2022-06-09 | Stop reason: SURG

## 2022-06-08 RX ORDER — OXYTOCIN/0.9 % SODIUM CHLORIDE 30/500 ML
2-20 PLASTIC BAG, INJECTION (ML) INTRAVENOUS
Status: DISCONTINUED | OUTPATIENT
Start: 2022-06-08 | End: 2022-06-09

## 2022-06-08 RX ORDER — DOCUSATE SODIUM 100 MG/1
100 CAPSULE, LIQUID FILLED ORAL 2 TIMES DAILY
COMMUNITY

## 2022-06-08 RX ORDER — LIDOCAINE HYDROCHLORIDE 10 MG/ML
5 INJECTION, SOLUTION EPIDURAL; INFILTRATION; INTRACAUDAL; PERINEURAL AS NEEDED
Status: DISCONTINUED | OUTPATIENT
Start: 2022-06-08 | End: 2022-06-09

## 2022-06-08 RX ORDER — PRENATAL VIT/IRON FUM/FOLIC AC 27MG-0.8MG
1 TABLET ORAL DAILY
COMMUNITY

## 2022-06-08 RX ORDER — FENTANYL CITRATE 50 UG/ML
100 INJECTION, SOLUTION INTRAMUSCULAR; INTRAVENOUS
Status: DISCONTINUED | OUTPATIENT
Start: 2022-06-08 | End: 2022-06-09

## 2022-06-08 RX ADMIN — Medication 10 ML/HR: at 18:15

## 2022-06-08 RX ADMIN — Medication 10 ML/HR: at 22:51

## 2022-06-08 RX ADMIN — OXYTOCIN-SODIUM CHLORIDE 0.9% IV SOLN 30 UNIT/500ML 2 MILLI-UNITS/MIN: 30-0.9/5 SOLUTION at 12:20

## 2022-06-08 RX ADMIN — SODIUM CHLORIDE, POTASSIUM CHLORIDE, SODIUM LACTATE AND CALCIUM CHLORIDE 1000 ML: 600; 310; 30; 20 INJECTION, SOLUTION INTRAVENOUS at 08:59

## 2022-06-08 RX ADMIN — FENTANYL CITRATE 100 MCG: 50 INJECTION, SOLUTION INTRAMUSCULAR; INTRAVENOUS at 17:20

## 2022-06-08 RX ADMIN — SODIUM CHLORIDE, POTASSIUM CHLORIDE, SODIUM LACTATE AND CALCIUM CHLORIDE 125 ML/HR: 600; 310; 30; 20 INJECTION, SOLUTION INTRAVENOUS at 13:23

## 2022-06-08 RX ADMIN — LIDOCAINE HYDROCHLORIDE AND EPINEPHRINE 3 ML: 15; 5 INJECTION, SOLUTION EPIDURAL at 18:06

## 2022-06-08 RX ADMIN — FENTANYL CITRATE 100 MCG: 50 INJECTION, SOLUTION INTRAMUSCULAR; INTRAVENOUS at 15:34

## 2022-06-08 RX ADMIN — SODIUM CHLORIDE, POTASSIUM CHLORIDE, SODIUM LACTATE AND CALCIUM CHLORIDE 1000 ML: 600; 310; 30; 20 INJECTION, SOLUTION INTRAVENOUS at 18:10

## 2022-06-08 RX ADMIN — LIDOCAINE HYDROCHLORIDE AND EPINEPHRINE 2 ML: 15; 5 INJECTION, SOLUTION EPIDURAL at 18:10

## 2022-06-08 RX ADMIN — LIDOCAINE HYDROCHLORIDE AND EPINEPHRINE 2 ML: 15; 5 INJECTION, SOLUTION EPIDURAL at 18:55

## 2022-06-08 RX ADMIN — Medication 2 MILLI-UNITS/MIN: at 12:20

## 2022-06-08 RX ADMIN — SODIUM CHLORIDE, POTASSIUM CHLORIDE, SODIUM LACTATE AND CALCIUM CHLORIDE 125 ML/HR: 600; 310; 30; 20 INJECTION, SOLUTION INTRAVENOUS at 22:23

## 2022-06-08 RX ADMIN — DINOPROSTONE 10 MG: 10 INSERT VAGINAL at 06:59

## 2022-06-08 RX ADMIN — LIDOCAINE HYDROCHLORIDE AND EPINEPHRINE 3 ML: 15; 5 INJECTION, SOLUTION EPIDURAL at 18:51

## 2022-06-08 NOTE — ANESTHESIA PROCEDURE NOTES
Labor Epidural    Pre-sedation assessment completed: 6/8/2022 5:57 PM    Patient reassessed immediately prior to procedure    Patient location during procedure: OB  Start Time: 6/8/2022 6:00 PM  Stop Time: 6/8/2022 6:07 PM  Performed By  Anesthesiologist: Sagrario Cochran MD  Preanesthetic Checklist  Completed: patient identified, IV checked, site marked, risks and benefits discussed, surgical consent, monitors and equipment checked, pre-op evaluation and timeout performed  Prep:  Pt Position:sitting  Sterile Tech:cap, gloves, gown and mask  Prep:chlorhexidine gluconate and isopropyl alcohol  Monitoring:blood pressure monitoring, continuous pulse oximetry and EKG (FHTs)  Epidural Block Procedure:  Approach:midline  Guidance:landmark technique and palpation technique  Location:L4-L5  Needle Type:Tuohy  Needle Gauge:17 G  Loss of Resistance Medium: saline  Loss of Resistance: 6cm  Catheter at skin depth (cm): threaded 3 cm.  Paresthesia: none  Aspiration:negative  Test Dose:negative  Med administered at 6/8/2022 6:07 PM  Number of Attempts: 1  Post Assessment:  Dressing:occlusive dressing applied and secured with tape  Pt Tolerance:patient tolerated the procedure well with no apparent complications  Complications:no

## 2022-06-08 NOTE — PLAN OF CARE
Problem: Adult Inpatient Plan of Care  Goal: Plan of Care Review  Outcome: Ongoing, Progressing  Goal: Patient-Specific Goal (Individualized)  Outcome: Ongoing, Progressing  Goal: Absence of Hospital-Acquired Illness or Injury  Outcome: Ongoing, Progressing  Intervention: Identify and Manage Fall Risk  Recent Flowsheet Documentation  Taken 6/8/2022 1720 by Ariella Min RN  Safety Promotion/Fall Prevention:   safety round/check completed   room organization consistent   nonskid shoes/slippers when out of bed   assistive device/personal items within reach   activity supervised   clutter free environment maintained  Taken 6/8/2022 0830 by Ariella Min RN  Safety Promotion/Fall Prevention:   safety round/check completed   room organization consistent   nonskid shoes/slippers when out of bed   clutter free environment maintained   assistive device/personal items within reach   activity supervised  Intervention: Prevent Skin Injury  Recent Flowsheet Documentation  Taken 6/8/2022 1720 by Areilla Min RN  Body Position: sitting up in bed  Taken 6/8/2022 0830 by Ariella Min RN  Body Position: side-lying  Intervention: Prevent and Manage VTE (Venous Thromboembolism) Risk  Recent Flowsheet Documentation  Taken 6/8/2022 1720 by Ariella Min RN  Activity Management: up ad martha  Taken 6/8/2022 0830 by Ariella Min RN  Activity Management: up ad martha  Intervention: Prevent Infection  Recent Flowsheet Documentation  Taken 6/8/2022 1720 by Ariella Min RN  Infection Prevention:   cohorting utilized   environmental surveillance performed   equipment surfaces disinfected   rest/sleep promoted   personal protective equipment utilized   hand hygiene promoted   visitors restricted/screened   single patient room provided  Taken 6/8/2022 0830 by Ariella Min RN  Infection Prevention:   cohorting utilized   environmental surveillance performed   hand hygiene promoted   personal protective  equipment utilized   rest/sleep promoted   single patient room provided   visitors restricted/screened  Goal: Optimal Comfort and Wellbeing  Outcome: Ongoing, Progressing  Intervention: Monitor Pain and Promote Comfort  Recent Flowsheet Documentation  Taken 6/8/2022 1720 by Ariella Min RN  Pain Management Interventions: see MAR  Taken 6/8/2022 1534 by Ariella Min RN  Pain Management Interventions: see MAR  Taken 6/8/2022 0830 by Ariella Min RN  Pain Management Interventions: pain management plan reviewed with patient/caregiver  Intervention: Provide Person-Centered Care  Recent Flowsheet Documentation  Taken 6/8/2022 1720 by Ariella Min RN  Trust Relationship/Rapport:   care explained   choices provided   emotional support provided   empathic listening provided   questions encouraged   questions answered   reassurance provided   thoughts/feelings acknowledged  Taken 6/8/2022 0830 by Ariella Min RN  Trust Relationship/Rapport:   care explained   choices provided   emotional support provided   empathic listening provided   questions answered   questions encouraged   reassurance provided   thoughts/feelings acknowledged  Goal: Readiness for Transition of Care  Outcome: Ongoing, Progressing     Problem: Bleeding (Labor)  Goal: Hemostasis  Outcome: Ongoing, Progressing     Problem: Change in Fetal Wellbeing (Labor)  Goal: Stable Fetal Wellbeing  Outcome: Ongoing, Progressing  Intervention: Promote and Monitor Fetal Wellbeing  Recent Flowsheet Documentation  Taken 6/8/2022 1720 by Ariella Min RN  Body Position: sitting up in bed  Taken 6/8/2022 0830 by Ariella Min RN  Body Position: side-lying     Problem: Delayed Labor Progression (Labor)  Goal: Effective Progression to Delivery  Outcome: Ongoing, Progressing     Problem: Infection (Labor)  Goal: Absence of Infection Signs and Symptoms  Outcome: Ongoing, Progressing  Intervention: Prevent or Manage Infection  Recent  Flowsheet Documentation  Taken 6/8/2022 1720 by Ariella Min RN  Infection Prevention:   cohorting utilized   environmental surveillance performed   equipment surfaces disinfected   rest/sleep promoted   personal protective equipment utilized   hand hygiene promoted   visitors restricted/screened   single patient room provided  Taken 6/8/2022 0830 by Ariella Min RN  Infection Prevention:   cohorting utilized   environmental surveillance performed   hand hygiene promoted   personal protective equipment utilized   rest/sleep promoted   single patient room provided   visitors restricted/screened     Problem: Labor Pain (Labor)  Goal: Acceptable Pain Control  Outcome: Ongoing, Progressing     Problem: Uterine Tachysystole (Labor)  Goal: Normal Uterine Contraction Pattern  Outcome: Ongoing, Progressing  Intervention: Monitor and Manage Uterine Activity  Recent Flowsheet Documentation  Taken 6/8/2022 1720 by Ariella Min RN  Fluid/Electrolyte Management: fluids provided   Goal Outcome Evaluatiop    Cervidil and pitocin induction for post dates - SVE 1/70%/-3 at this time, SROM clear at 1030, pt hoping for vag delivery.

## 2022-06-08 NOTE — ANESTHESIA PREPROCEDURE EVALUATION
Anesthesia Evaluation     Patient summary reviewed and Nursing notes reviewed   no history of anesthetic complications:  NPO Solid Status: Waived due to emergency  NPO Liquid Status: Waived due to emergency           Airway   Mallampati: II  TM distance: >3 FB  Neck ROM: full  No difficulty expected  Dental - normal exam     Pulmonary - normal exam   (+) a smoker Current,   Cardiovascular - negative cardio ROS and normal exam  Exercise tolerance: good (4-7 METS)    Rhythm: regular  Rate: normal        Neuro/Psych  (+) headaches (migraines),    GI/Hepatic/Renal/Endo - negative ROS     Musculoskeletal (-) negative ROS    Abdominal  - normal exam   Substance History   Drug use: ON SUBUTEX.     OB/GYN    (+) Pregnant,         Other - negative ROS                       Anesthesia Plan    ASA 2     epidural     intravenous induction     Anesthetic plan, risks, benefits, and alternatives have been provided, discussed and informed consent has been obtained with: patient and mother.  Use of blood products discussed with patient  Consented to blood products.       CODE STATUS:

## 2022-06-08 NOTE — ANESTHESIA PROCEDURE NOTES
Labor Epidural    Pre-sedation assessment completed: 6/8/2022 6:43 PM    Patient reassessed immediately prior to procedure    Patient location during procedure: OB  Start Time: 6/8/2022 6:45 PM  Stop Time: 6/8/2022 6:55 PM  Performed By  Anesthesiologist: Sagrario Cochran MD  Preanesthetic Checklist  Completed: patient identified, IV checked, site marked, risks and benefits discussed, surgical consent, monitors and equipment checked, pre-op evaluation and timeout performed  Additional Notes  Initial epidural catheter pulled intact prior to prep and drape for second epidural.  Prep:  Pt Position:sitting  Sterile Tech:cap, gloves, mask and sterile barrier  Prep:chlorhexidine gluconate and isopropyl alcohol  Monitoring:blood pressure monitoring, continuous pulse oximetry and EKG (FHTs)  Epidural Block Procedure:  Approach:midline  Guidance:landmark technique and palpation technique  Location:L3-L4  Needle Type:Tuohy  Needle Gauge:17 G  Loss of Resistance Medium: saline  Loss of Resistance: 6cm  Catheter at skin depth (cm): catheter threaded 4 cm.  Paresthesia: none  Aspiration:negative  Test Dose:negative  Med administered at 6/8/2022 8:51 PM  Number of Attempts: 1  Post Assessment:  Dressing:occlusive dressing applied and secured with tape  Pt Tolerance:patient tolerated the procedure well with no apparent complications  Complications:no

## 2022-06-09 PROBLEM — Z34.90 PREGNANT: Status: RESOLVED | Noted: 2022-06-08 | Resolved: 2022-06-09

## 2022-06-09 PROBLEM — Z98.891 S/P CESAREAN SECTION: Status: ACTIVE | Noted: 2022-06-09

## 2022-06-09 PROBLEM — Z34.90 PREGNANCY: Status: RESOLVED | Noted: 2022-02-23 | Resolved: 2022-06-09

## 2022-06-09 LAB
COLLECT DURATION TIME UR: 24 HRS
PROT 24H UR-MRATE: 3.6 MG/24HOURS (ref 28–141)
SPECIMEN VOL 24H UR: 24 ML

## 2022-06-09 PROCEDURE — 25010000002 CHLOROPROCAINE HCL (PF) 3 % SOLUTION: Performed by: ANESTHESIOLOGY

## 2022-06-09 PROCEDURE — 0 CEFAZOLIN SODIUM-DEXTROSE 2-3 GM-%(50ML) RECONSTITUTED SOLUTION: Performed by: OBSTETRICS & GYNECOLOGY

## 2022-06-09 PROCEDURE — 59514 CESAREAN DELIVERY ONLY: CPT | Performed by: SPECIALIST/TECHNOLOGIST, OTHER

## 2022-06-09 PROCEDURE — 25010000002 KETOROLAC TROMETHAMINE PER 15 MG: Performed by: OBSTETRICS & GYNECOLOGY

## 2022-06-09 PROCEDURE — 25010000002 PROPOFOL 10 MG/ML EMULSION: Performed by: ANESTHESIOLOGY

## 2022-06-09 PROCEDURE — 25010000002 MIDAZOLAM PER 1 MG: Performed by: ANESTHESIOLOGY

## 2022-06-09 PROCEDURE — 25010000002 AZITHROMYCIN PER 500 MG: Performed by: OBSTETRICS & GYNECOLOGY

## 2022-06-09 PROCEDURE — 25010000002 ONDANSETRON PER 1 MG: Performed by: ANESTHESIOLOGY

## 2022-06-09 PROCEDURE — 59510 CESAREAN DELIVERY: CPT | Performed by: OBSTETRICS & GYNECOLOGY

## 2022-06-09 PROCEDURE — S0260 H&P FOR SURGERY: HCPCS | Performed by: OBSTETRICS & GYNECOLOGY

## 2022-06-09 PROCEDURE — 25010000002 MORPHINE PER 10 MG: Performed by: ANESTHESIOLOGY

## 2022-06-09 PROCEDURE — 25010000002 KETOROLAC TROMETHAMINE PER 15 MG: Performed by: ANESTHESIOLOGY

## 2022-06-09 PROCEDURE — 81050 URINALYSIS VOLUME MEASURE: CPT | Performed by: OBSTETRICS & GYNECOLOGY

## 2022-06-09 PROCEDURE — 84156 ASSAY OF PROTEIN URINE: CPT | Performed by: OBSTETRICS & GYNECOLOGY

## 2022-06-09 RX ORDER — MISOPROSTOL 200 UG/1
800 TABLET ORAL ONCE AS NEEDED
Status: DISCONTINUED | OUTPATIENT
Start: 2022-06-09 | End: 2022-06-11 | Stop reason: HOSPADM

## 2022-06-09 RX ORDER — SCOLOPAMINE TRANSDERMAL SYSTEM 1 MG/1
PATCH, EXTENDED RELEASE TRANSDERMAL AS NEEDED
Status: DISCONTINUED | OUTPATIENT
Start: 2022-06-09 | End: 2022-06-09 | Stop reason: SURG

## 2022-06-09 RX ORDER — CARBOPROST TROMETHAMINE 250 UG/ML
250 INJECTION, SOLUTION INTRAMUSCULAR
Status: DISCONTINUED | OUTPATIENT
Start: 2022-06-09 | End: 2022-06-11 | Stop reason: HOSPADM

## 2022-06-09 RX ORDER — BUPIVACAINE HYDROCHLORIDE 5 MG/ML
INJECTION, SOLUTION EPIDURAL; INTRACAUDAL AS NEEDED
Status: DISCONTINUED | OUTPATIENT
Start: 2022-06-09 | End: 2022-06-09 | Stop reason: SURG

## 2022-06-09 RX ORDER — SODIUM CHLORIDE, SODIUM LACTATE, POTASSIUM CHLORIDE, CALCIUM CHLORIDE 600; 310; 30; 20 MG/100ML; MG/100ML; MG/100ML; MG/100ML
125 INJECTION, SOLUTION INTRAVENOUS CONTINUOUS
Status: DISCONTINUED | OUTPATIENT
Start: 2022-06-09 | End: 2022-06-11 | Stop reason: HOSPADM

## 2022-06-09 RX ORDER — KETOROLAC TROMETHAMINE 30 MG/ML
30 INJECTION, SOLUTION INTRAMUSCULAR; INTRAVENOUS EVERY 6 HOURS
Status: DISCONTINUED | OUTPATIENT
Start: 2022-06-09 | End: 2022-06-09

## 2022-06-09 RX ORDER — PROMETHAZINE HYDROCHLORIDE 25 MG/1
25 TABLET ORAL EVERY 6 HOURS PRN
Status: DISCONTINUED | OUTPATIENT
Start: 2022-06-09 | End: 2022-06-11 | Stop reason: HOSPADM

## 2022-06-09 RX ORDER — SODIUM CHLORIDE 9 MG/ML
INJECTION, SOLUTION INTRAVENOUS
Status: DISPENSED
Start: 2022-06-09 | End: 2022-06-09

## 2022-06-09 RX ORDER — SODIUM CHLORIDE 0.9 % (FLUSH) 0.9 %
10 SYRINGE (ML) INJECTION EVERY 12 HOURS SCHEDULED
Status: DISCONTINUED | OUTPATIENT
Start: 2022-06-09 | End: 2022-06-09

## 2022-06-09 RX ORDER — IBUPROFEN 800 MG/1
800 TABLET ORAL EVERY 8 HOURS PRN
Status: DISCONTINUED | OUTPATIENT
Start: 2022-06-10 | End: 2022-06-11 | Stop reason: HOSPADM

## 2022-06-09 RX ORDER — BUPRENORPHINE 2 MG/1
8 TABLET SUBLINGUAL DAILY
Status: DISCONTINUED | OUTPATIENT
Start: 2022-06-09 | End: 2022-06-10

## 2022-06-09 RX ORDER — OXYTOCIN/0.9 % SODIUM CHLORIDE 30/500 ML
650 PLASTIC BAG, INJECTION (ML) INTRAVENOUS ONCE
Status: DISCONTINUED | OUTPATIENT
Start: 2022-06-09 | End: 2022-06-11 | Stop reason: HOSPADM

## 2022-06-09 RX ORDER — OXYCODONE HYDROCHLORIDE AND ACETAMINOPHEN 5; 325 MG/1; MG/1
1 TABLET ORAL EVERY 4 HOURS PRN
Status: ACTIVE | OUTPATIENT
Start: 2022-06-09 | End: 2022-06-10

## 2022-06-09 RX ORDER — OXYCODONE AND ACETAMINOPHEN 7.5; 325 MG/1; MG/1
1 TABLET ORAL EVERY 4 HOURS PRN
Status: ACTIVE | OUTPATIENT
Start: 2022-06-09 | End: 2022-06-10

## 2022-06-09 RX ORDER — KETOROLAC TROMETHAMINE 30 MG/ML
30 INJECTION, SOLUTION INTRAMUSCULAR; INTRAVENOUS EVERY 6 HOURS
Status: COMPLETED | OUTPATIENT
Start: 2022-06-09 | End: 2022-06-10

## 2022-06-09 RX ORDER — MORPHINE SULFATE 1 MG/ML
INJECTION, SOLUTION EPIDURAL; INTRATHECAL; INTRAVENOUS AS NEEDED
Status: DISCONTINUED | OUTPATIENT
Start: 2022-06-09 | End: 2022-06-09 | Stop reason: SURG

## 2022-06-09 RX ORDER — METHYLERGONOVINE MALEATE 0.2 MG/ML
200 INJECTION INTRAVENOUS ONCE AS NEEDED
Status: DISCONTINUED | OUTPATIENT
Start: 2022-06-09 | End: 2022-06-11 | Stop reason: HOSPADM

## 2022-06-09 RX ORDER — ONDANSETRON 2 MG/ML
4 INJECTION INTRAMUSCULAR; INTRAVENOUS EVERY 6 HOURS PRN
Status: DISCONTINUED | OUTPATIENT
Start: 2022-06-09 | End: 2022-06-11 | Stop reason: HOSPADM

## 2022-06-09 RX ORDER — PROPOFOL 10 MG/ML
VIAL (ML) INTRAVENOUS AS NEEDED
Status: DISCONTINUED | OUTPATIENT
Start: 2022-06-09 | End: 2022-06-09 | Stop reason: SURG

## 2022-06-09 RX ORDER — OXYTOCIN/0.9 % SODIUM CHLORIDE 30/500 ML
85 PLASTIC BAG, INJECTION (ML) INTRAVENOUS ONCE
Status: DISCONTINUED | OUTPATIENT
Start: 2022-06-09 | End: 2022-06-11 | Stop reason: HOSPADM

## 2022-06-09 RX ORDER — SODIUM CHLORIDE 0.9 % (FLUSH) 0.9 %
10 SYRINGE (ML) INJECTION AS NEEDED
Status: DISCONTINUED | OUTPATIENT
Start: 2022-06-09 | End: 2022-06-09

## 2022-06-09 RX ORDER — KETOROLAC TROMETHAMINE 30 MG/ML
INJECTION, SOLUTION INTRAMUSCULAR; INTRAVENOUS AS NEEDED
Status: DISCONTINUED | OUTPATIENT
Start: 2022-06-09 | End: 2022-06-09 | Stop reason: SURG

## 2022-06-09 RX ORDER — DOCUSATE SODIUM 100 MG/1
100 CAPSULE, LIQUID FILLED ORAL 2 TIMES DAILY PRN
Status: DISCONTINUED | OUTPATIENT
Start: 2022-06-09 | End: 2022-06-11 | Stop reason: HOSPADM

## 2022-06-09 RX ORDER — LIDOCAINE HYDROCHLORIDE 20 MG/ML
INJECTION, SOLUTION EPIDURAL; INFILTRATION; INTRACAUDAL; PERINEURAL AS NEEDED
Status: DISCONTINUED | OUTPATIENT
Start: 2022-06-09 | End: 2022-06-09 | Stop reason: SURG

## 2022-06-09 RX ORDER — GABAPENTIN 400 MG/1
800 CAPSULE ORAL EVERY 12 HOURS SCHEDULED
Status: DISCONTINUED | OUTPATIENT
Start: 2022-06-09 | End: 2022-06-11 | Stop reason: HOSPADM

## 2022-06-09 RX ORDER — ONDANSETRON 2 MG/ML
INJECTION INTRAMUSCULAR; INTRAVENOUS AS NEEDED
Status: DISCONTINUED | OUTPATIENT
Start: 2022-06-09 | End: 2022-06-09 | Stop reason: SURG

## 2022-06-09 RX ORDER — CHLOROPROCAINE HYDROCHLORIDE 30 MG/ML
INJECTION, SOLUTION EPIDURAL; INFILTRATION; INTRACAUDAL; PERINEURAL AS NEEDED
Status: DISCONTINUED | OUTPATIENT
Start: 2022-06-09 | End: 2022-06-09 | Stop reason: SURG

## 2022-06-09 RX ORDER — LIDOCAINE HYDROCHLORIDE 10 MG/ML
5 INJECTION, SOLUTION EPIDURAL; INFILTRATION; INTRACAUDAL; PERINEURAL AS NEEDED
Status: DISCONTINUED | OUTPATIENT
Start: 2022-06-09 | End: 2022-06-09

## 2022-06-09 RX ORDER — PROMETHAZINE HYDROCHLORIDE 25 MG/1
12.5 SUPPOSITORY RECTAL EVERY 6 HOURS PRN
Status: DISCONTINUED | OUTPATIENT
Start: 2022-06-09 | End: 2022-06-11 | Stop reason: HOSPADM

## 2022-06-09 RX ORDER — PRENATAL VIT/IRON FUM/FOLIC AC 27MG-0.8MG
1 TABLET ORAL DAILY
Status: DISCONTINUED | OUTPATIENT
Start: 2022-06-09 | End: 2022-06-11 | Stop reason: HOSPADM

## 2022-06-09 RX ORDER — SIMETHICONE 80 MG
80 TABLET,CHEWABLE ORAL 4 TIMES DAILY PRN
Status: DISCONTINUED | OUTPATIENT
Start: 2022-06-09 | End: 2022-06-11 | Stop reason: HOSPADM

## 2022-06-09 RX ORDER — MIDAZOLAM HYDROCHLORIDE 1 MG/ML
INJECTION INTRAMUSCULAR; INTRAVENOUS AS NEEDED
Status: DISCONTINUED | OUTPATIENT
Start: 2022-06-09 | End: 2022-06-09 | Stop reason: SURG

## 2022-06-09 RX ORDER — HYDROMORPHONE HCL 110MG/55ML
0.5 PATIENT CONTROLLED ANALGESIA SYRINGE INTRAVENOUS
Status: DISCONTINUED | OUTPATIENT
Start: 2022-06-09 | End: 2022-06-11 | Stop reason: HOSPADM

## 2022-06-09 RX ORDER — HYDROCODONE BITARTRATE AND ACETAMINOPHEN 5; 325 MG/1; MG/1
2 TABLET ORAL EVERY 4 HOURS PRN
Status: DISCONTINUED | OUTPATIENT
Start: 2022-06-10 | End: 2022-06-11 | Stop reason: HOSPADM

## 2022-06-09 RX ORDER — HYDROCODONE BITARTRATE AND ACETAMINOPHEN 5; 325 MG/1; MG/1
1 TABLET ORAL EVERY 4 HOURS PRN
Status: DISCONTINUED | OUTPATIENT
Start: 2022-06-10 | End: 2022-06-11 | Stop reason: HOSPADM

## 2022-06-09 RX ORDER — CEFAZOLIN SODIUM 2 G/50ML
2 SOLUTION INTRAVENOUS ONCE
Status: COMPLETED | OUTPATIENT
Start: 2022-06-09 | End: 2022-06-09

## 2022-06-09 RX ORDER — KETAMINE HYDROCHLORIDE 10 MG/ML
INJECTION INTRAMUSCULAR; INTRAVENOUS AS NEEDED
Status: DISCONTINUED | OUTPATIENT
Start: 2022-06-09 | End: 2022-06-09 | Stop reason: SURG

## 2022-06-09 RX ORDER — ONDANSETRON 4 MG/1
4 TABLET, FILM COATED ORAL EVERY 6 HOURS PRN
Status: DISCONTINUED | OUTPATIENT
Start: 2022-06-09 | End: 2022-06-11 | Stop reason: HOSPADM

## 2022-06-09 RX ADMIN — PROPOFOL 20 MG: 10 INJECTION, EMULSION INTRAVENOUS at 05:58

## 2022-06-09 RX ADMIN — MORPHINE SULFATE 3 MG: 1 INJECTION, SOLUTION EPIDURAL; INTRATHECAL; INTRAVENOUS at 05:41

## 2022-06-09 RX ADMIN — BUPIVACAINE HYDROCHLORIDE 5 ML: 5 INJECTION, SOLUTION EPIDURAL; INTRACAUDAL at 05:06

## 2022-06-09 RX ADMIN — GABAPENTIN 800 MG: 400 CAPSULE ORAL at 21:19

## 2022-06-09 RX ADMIN — CHLOROPROCAINE HYDROCHLORIDE 5 MG: 30 INJECTION, SOLUTION EPIDURAL; INFILTRATION; INTRACAUDAL; PERINEURAL at 05:57

## 2022-06-09 RX ADMIN — SODIUM CHLORIDE, POTASSIUM CHLORIDE, SODIUM LACTATE AND CALCIUM CHLORIDE 125 ML/HR: 600; 310; 30; 20 INJECTION, SOLUTION INTRAVENOUS at 17:55

## 2022-06-09 RX ADMIN — BUPIVACAINE HYDROCHLORIDE 5 ML: 5 INJECTION, SOLUTION EPIDURAL; INTRACAUDAL at 05:00

## 2022-06-09 RX ADMIN — MIDAZOLAM 0.5 MG: 1 INJECTION INTRAMUSCULAR; INTRAVENOUS at 05:46

## 2022-06-09 RX ADMIN — BUPRENORPHINE HCL 8 MG: 2 TABLET SUBLINGUAL at 09:51

## 2022-06-09 RX ADMIN — KETOROLAC TROMETHAMINE 30 MG: 30 INJECTION, SOLUTION INTRAMUSCULAR; INTRAVENOUS at 15:50

## 2022-06-09 RX ADMIN — KETOROLAC TROMETHAMINE 30 MG: 30 INJECTION, SOLUTION INTRAMUSCULAR; INTRAVENOUS at 09:52

## 2022-06-09 RX ADMIN — PROPOFOL 10 MG: 10 INJECTION, EMULSION INTRAVENOUS at 05:46

## 2022-06-09 RX ADMIN — MIDAZOLAM 0.5 MG: 1 INJECTION INTRAMUSCULAR; INTRAVENOUS at 05:51

## 2022-06-09 RX ADMIN — KETOROLAC TROMETHAMINE 30 MG: 30 INJECTION, SOLUTION INTRAMUSCULAR; INTRAVENOUS at 22:31

## 2022-06-09 RX ADMIN — LIDOCAINE HYDROCHLORIDE 5 ML: 20 INJECTION, SOLUTION EPIDURAL; INFILTRATION; INTRACAUDAL; PERINEURAL at 04:11

## 2022-06-09 RX ADMIN — CEFAZOLIN SODIUM 2 G: 2 SOLUTION INTRAVENOUS at 05:19

## 2022-06-09 RX ADMIN — KETAMINE HYDROCHLORIDE 10 MG: 10 INJECTION, SOLUTION INTRAMUSCULAR; INTRAVENOUS at 06:01

## 2022-06-09 RX ADMIN — LIDOCAINE HYDROCHLORIDE 5 ML: 20 INJECTION, SOLUTION EPIDURAL; INFILTRATION; INTRACAUDAL; PERINEURAL at 04:05

## 2022-06-09 RX ADMIN — BUPIVACAINE HYDROCHLORIDE 3 ML: 5 INJECTION, SOLUTION EPIDURAL; INTRACAUDAL at 04:54

## 2022-06-09 RX ADMIN — PROPOFOL 10 MG: 10 INJECTION, EMULSION INTRAVENOUS at 06:15

## 2022-06-09 RX ADMIN — SODIUM CHLORIDE, POTASSIUM CHLORIDE, SODIUM LACTATE AND CALCIUM CHLORIDE 125 ML/HR: 600; 310; 30; 20 INJECTION, SOLUTION INTRAVENOUS at 09:50

## 2022-06-09 RX ADMIN — KETAMINE HYDROCHLORIDE 10 MG: 10 INJECTION, SOLUTION INTRAMUSCULAR; INTRAVENOUS at 05:53

## 2022-06-09 RX ADMIN — ONDANSETRON 4 MG: 2 INJECTION INTRAMUSCULAR; INTRAVENOUS at 04:45

## 2022-06-09 RX ADMIN — PROPOFOL 20 MG: 10 INJECTION, EMULSION INTRAVENOUS at 05:50

## 2022-06-09 RX ADMIN — PROPOFOL 10 MG: 10 INJECTION, EMULSION INTRAVENOUS at 06:09

## 2022-06-09 RX ADMIN — AZITHROMYCIN MONOHYDRATE 500 MG: 500 INJECTION, POWDER, LYOPHILIZED, FOR SOLUTION INTRAVENOUS at 05:28

## 2022-06-09 RX ADMIN — KETOROLAC TROMETHAMINE 30 MG: 30 INJECTION, SOLUTION INTRAMUSCULAR; INTRAVENOUS at 06:02

## 2022-06-09 RX ADMIN — PRENATAL VIT W/ FE FUMARATE-FA TAB 27-0.8 MG 1 TABLET: 27-0.8 TAB at 09:52

## 2022-06-09 RX ADMIN — PROPOFOL 10 MG: 10 INJECTION, EMULSION INTRAVENOUS at 06:04

## 2022-06-09 RX ADMIN — BUPIVACAINE HYDROCHLORIDE 3 ML: 5 INJECTION, SOLUTION EPIDURAL; INTRACAUDAL at 04:50

## 2022-06-09 RX ADMIN — SCOPALAMINE 1 PATCH: 1 PATCH, EXTENDED RELEASE TRANSDERMAL at 04:47

## 2022-06-09 RX ADMIN — CHLOROPROCAINE HYDROCHLORIDE 5 MG: 30 INJECTION, SOLUTION EPIDURAL; INFILTRATION; INTRACAUDAL; PERINEURAL at 05:49

## 2022-06-09 NOTE — H&P
Patient Care Team:  Aj Sen MD as PCP - General (Family Medicine)    Chief complaint Failure to descend       HPI: 32-year-old G1, P0 presented yesterday at 40 weeks and 3 days for induction of labor due to past due date.  Patient's pregnancy has been complicated by some elevated blood pressures.  She had a 24-hour urine collected during the pregnancy and resulted at 88 mg of protein on 2022.  Patient has a history of drug abuse and is on Subutex 8 mg daily.  Her last growth ultrasound was at 37 weeks and the baby was in the 40th percentile.  Her GBBS is negative.  On admission she was found to be fingertip dilated.  A Cervidil was placed.  Patient had spontaneous rupture membranes on Cervidil and it was removed.  Patient was 1 cm dilated when the Cervidil was removed.  Pitocin was started and patient made very slow change.  She ultimately received her epidural and quickly became 5 cm.  She has been pushing for over an hour and has made no fetal descent.  The baby's fetal heart rate tracing is overall reassuring but is having some deep variables.  The urine is now turning bloody from trauma from the fetal head.  Discussed with patient that we would proceed with a primary  section due to failure to descend.  Patient father the baby agree with plan and all questions answered.    Debilities: None    Emotional Behavior: Appropriate    PMH:   Past Medical History:   Diagnosis Date   • HPV (human papilloma virus) infection 2008   • LGSIL on Pap smear of cervix 2008   • Migraine    • Ovarian cyst          PSH: History reviewed. No pertinent surgical history.    SoHx:   Social History     Socioeconomic History   • Marital status:    Tobacco Use   • Smoking status: Current Every Day Smoker     Packs/day: 0.50   • Smokeless tobacco: Never Used   Vaping Use   • Vaping Use: Never used   Substance and Sexual Activity   • Alcohol use: No   • Drug use: No   • Sexual activity: Yes      Partners: Male       FHx:   Family History   Problem Relation Age of Onset   • Depression Mother    • Hyperlipidemia Father    • Hypertension Father          Allergies: Benadryl [diphenhydramine]    Medications:   No current facility-administered medications on file prior to encounter.     Current Outpatient Medications on File Prior to Encounter   Medication Sig Dispense Refill   • gabapentin (NEURONTIN) 800 MG tablet Take 800 mg by mouth 2 (Two) Times a Day.     • buprenorphine (SUBUTEX) 8 MG sublingual tablet SL tablet TAKE 2 TABLET BELOW THE TONGUE DAILY (DISSOLVE UNDER TONGUE-DO NOT SWALLOW)     • cyclobenzaprine (FLEXERIL) 10 MG tablet      • docusate sodium (COLACE) 100 MG capsule Take 100 mg by mouth 2 (Two) Times a Day.     • doxylamine (UNISOM) 25 MG tablet Take 25 mg by mouth At Night As Needed for Sleep.     • Prenatal Vit-Fe Fumarate-FA (prenatal vitamin 27-0.8) 27-0.8 MG tablet tablet Take 1 tablet by mouth Daily.     • SUMAtriptan (IMITREX) 50 MG tablet Take 50 mg by mouth.     • vitamin B-6 (PYRIDOXINE) 50 MG tablet Take 50 mg by mouth Daily.               Vital Signs  Temp:  [97.9 °F (36.6 °C)-98.6 °F (37 °C)] 98.6 °F (37 °C)  Heart Rate:  [] 102  Resp:  [16-18] 16  BP: (112-160)/(58-97) 129/82    Physical Exam:  General: NAD  Heart:: RRR  Lungs: clear  Abdomen: gravid. +FHTs  Genitalia: 10/100/-1  Extremities: no calf tenderness. +SCDx  Psychological: AAOx3      Labs: hgb 14.2      Assessment/Plan: 31yo  at 40.4 weeks with failure to descend. Proceed with primary LTCS        I discussed the patients findings and my recommendations with patient and family.     Milana Thompson DO  22  05:28 EDT

## 2022-06-09 NOTE — ANESTHESIA PROCEDURE NOTES
Labor Epidural    Pre-sedation assessment completed: 6/9/2022 11:32 PM    Patient reassessed immediately prior to procedure    Patient location during procedure: OB  Start Time: 6/8/2022 11:35 PM  Stop Time: 6/8/2022 11:40 PM  Performed By  Anesthesiologist: Sagrario Cochran MD  Preanesthetic Checklist  Completed: patient identified, IV checked, site marked, risks and benefits discussed, surgical consent, monitors and equipment checked, pre-op evaluation and timeout performed  Additional Notes  Epicath pulled intact prior to replacement.  Prep:  Pt Position:sitting  Sterile Tech:cap, gloves, mask and sterile barrier  Prep:chlorhexidine gluconate and isopropyl alcohol  Monitoring:blood pressure monitoring, continuous pulse oximetry and EKG (FHTs)  Epidural Block Procedure:  Approach:midline  Guidance:palpation technique  Location:L4-L5  Needle Type:Tuohy  Needle Gauge:17 G  Loss of Resistance Medium: saline  Loss of Resistance: 5cm  Catheter at skin depth (cm): Threaded 3 cm.  Paresthesia: none  Aspiration:negative  Test Dose:negative  Med administered at 6/8/2022 11:38 PM  Number of Attempts: 1  Post Assessment:  Dressing:occlusive dressing applied and secured with tape  Pt Tolerance:patient tolerated the procedure well with no apparent complications  Complications:no

## 2022-06-09 NOTE — PLAN OF CARE
Problem: Adult Inpatient Plan of Care  Goal: Plan of Care Review  Outcome: Ongoing, Progressing  Flowsheets (Taken 6/9/2022 0700)  Progress: improving  Plan of Care Reviewed With: patient  Outcome Evaluation:   C/S done 6/9/22 due to arrest of descent. 24 hour urine in progress   ends 6/9 @ 1705. Pt bonding with infant. Plans to breast & bottle feed.   Goal Outcome Evaluation:  Plan of Care Reviewed With: patient        Progress: improving  Outcome Evaluation: C/S done 6/9/22 due to arrest of descent. 24 hour urine in progress; ends 6/9 @ 1705. Pt bonding with infant. Plans to breast & bottle feed.

## 2022-06-09 NOTE — L&D DELIVERY NOTE
[facility]   Section Operative Note    Pre-Operative Dx:   1) 32 y.o.   2) IUP at 40.4 weeks  3) Indications for  section: Failure to descend   4) Elevated BP  5) Hx of opioid abuse and currently on Subutex      Postoperative dx:    1.  Same     Procedure: , Low Transverse    Surgeon: Milana Thompson DO      Assistant: Marcelina Owens   Anesthesia: epidural   EBL: 800 mls.           IV Fluids: 686 mls.   UOP: 425 mls.    I/O this shift:  In: 686 [IV Piggyback:500]  Out: 1225 [Urine:425; Blood:800]   Antibiotics: cefazolin (Ancef) and Zithromax     Infant:      Time of Delivery     Gender: male  infant    Weight: No birth weight on file.     Apgars: 8  @ 1 minute /     9  @ 5 minutes      Meconium:   Nuchal Cord:    no  yes            Indication for C/Section: Failure to descned                                     Procedure Details:    32-year-old G1, P0 presented yesterday at 40 weeks and 3 days for induction of labor due to past due date.  Patient's pregnancy has been complicated by some elevated blood pressures.  She had a 24-hour urine collected during the pregnancy and resulted at 88 mg of protein on 2022.  Patient has a history of drug abuse and is on Subutex 8 mg daily.  Her last growth ultrasound was at 37 weeks and the baby was in the 40th percentile.  Her GBBS is negative.  On admission she was found to be fingertip dilated.  A Cervidil was placed.  Patient had spontaneous rupture membranes on Cervidil and it was removed.  Patient was 1 cm dilated when the Cervidil was removed.  Pitocin was started and patient made very slow change.  She ultimately received her epidural and quickly became 5 cm.  She has been pushing for over an hour and has made no fetal descent.  The baby's fetal heart rate tracing is overall reassuring but is having some deep variables.  The urine is now turning bloody from trauma from the fetal head.  Discussed with patient that we would proceed with a  primary  section due to failure to descend.  Patient father the baby agree with plan and all questions answered.     Once all questions were answered, the patient was given IV antibiotics for ID prophylaxis. Pt was taken to the OR where spinal anesthesia was administered. A pearson catheter was placed and hung to gravity for the duration of the procedure. SCD's were placed on the lower extremities bilaterally for DVT prophylaxis. Once the pt was prepped and draped and anesthesia was found to be adequate, a Pfannensteil skin incision was made on the abdomen and carried down to the fascia. The fascia was incised and extended with Cole scissors. Kocher clamps were placed on the superior and inferior aspect of the fascia and the rectus abdominal muscles were sharply and bluntly taken down. The rectus abdominal muscles were  in the midline and the periteoneum was entered and bluntly extended. A bladder blade was then inserted and a bladder flap was created. A low transverse incision was made on the uterus and bluntly extended. Artificial rupture of membranes was performed with clear fluid noted. The fetal head was delivered and a nuchal cord was reduced. This was followed by the rest of the body. Cord was clamped and cut and baby taken to the warmer. Cord blood was collected and the placenta was delivered. The uterus was exteriorized and cleared of all clots and debris. The uterine incision was repaired in 2 layers. The first layer was a running and locked fashion with 0-Vicryl and the second layer was an imbricating repair with 0-Vicryl suture. The uterus was firm and hemostatic. The abdomen was irrigated and aspirated with warm normal saline. The uterus was placed back into the abdomen. The fascia was reapproximated with 0-vicryl suture. The subcutaneous layer was irrigated and cauterized as needed for hemostasis. The skin was reapproximated with 4-0 vicryl. Pt tolerated the procedure well. Mom and baby  are stable and progressing well.      Complications:   None      Disposition:   Mother to Mother Baby/Postpartum  in stable condition currently.   Baby to remains with mom  in stable condition currently.    Assistant: Marcelina Owens CSA was responsible for performing the following activities: Retraction, Suction, Irrigation, Closing and Placing Dressing and their skilled assistance was necessary for the success of this case.       Milana Thompson DO  6/9/2022  06:40 EDT

## 2022-06-09 NOTE — PLAN OF CARE
Goal Outcome Evaluation:  Plan of Care Reviewed With: patient        Progress: improving  Outcome Evaluation: Vitals wnl. FFu/u, scant rubra lochia. Bowel sounds present, pt denies passing flatus. Campbell patent draining via gravity. Pt pain controlled with scheduled toradol iv. Pt ambulating in room and hallway. Breastfeeding on demand. Bonding well. Cont with plan of care. Update MD as needed with changes.

## 2022-06-09 NOTE — NURSING NOTE
Dr. Thompson updated on pt status. Pt complete; pushing since 0250. Pt has pushed in hands and knees. Baby still high in pelvis (0 station). Pt hurting.  Dr. Cochran called for redose. Will try to labor down after redose and pain control.

## 2022-06-10 LAB
HCT VFR BLD AUTO: 31.7 % (ref 34–46.6)
HGB BLD-MCNC: 10.8 G/DL (ref 12–15.9)

## 2022-06-10 PROCEDURE — 85018 HEMOGLOBIN: CPT | Performed by: OBSTETRICS & GYNECOLOGY

## 2022-06-10 PROCEDURE — 0503F POSTPARTUM CARE VISIT: CPT | Performed by: OBSTETRICS & GYNECOLOGY

## 2022-06-10 PROCEDURE — 25010000002 KETOROLAC TROMETHAMINE PER 15 MG: Performed by: OBSTETRICS & GYNECOLOGY

## 2022-06-10 PROCEDURE — 85014 HEMATOCRIT: CPT | Performed by: OBSTETRICS & GYNECOLOGY

## 2022-06-10 RX ORDER — BUPRENORPHINE HYDROCHLORIDE 8 MG/1
16 TABLET SUBLINGUAL DAILY
Status: DISCONTINUED | OUTPATIENT
Start: 2022-06-10 | End: 2022-06-11 | Stop reason: HOSPADM

## 2022-06-10 RX ORDER — BUPRENORPHINE HYDROCHLORIDE 8 MG/1
8 TABLET SUBLINGUAL DAILY
Status: DISCONTINUED | OUTPATIENT
Start: 2022-06-10 | End: 2022-06-10

## 2022-06-10 RX ADMIN — DOCUSATE SODIUM 100 MG: 100 CAPSULE, LIQUID FILLED ORAL at 08:57

## 2022-06-10 RX ADMIN — BUPRENORPHINE HCL 8 MG: 2 TABLET SUBLINGUAL at 08:57

## 2022-06-10 RX ADMIN — BUPRENORPHINE 8 MG: 8 TABLET SUBLINGUAL at 09:57

## 2022-06-10 RX ADMIN — GABAPENTIN 800 MG: 400 CAPSULE ORAL at 08:56

## 2022-06-10 RX ADMIN — GABAPENTIN 800 MG: 400 CAPSULE ORAL at 21:18

## 2022-06-10 RX ADMIN — KETOROLAC TROMETHAMINE 30 MG: 30 INJECTION, SOLUTION INTRAMUSCULAR; INTRAVENOUS at 04:41

## 2022-06-10 RX ADMIN — IBUPROFEN 800 MG: 800 TABLET, FILM COATED ORAL at 17:20

## 2022-06-10 RX ADMIN — PRENATAL VIT W/ FE FUMARATE-FA TAB 27-0.8 MG 1 TABLET: 27-0.8 TAB at 08:56

## 2022-06-10 RX ADMIN — Medication 1 CAPSULE: at 08:56

## 2022-06-10 NOTE — PROGRESS NOTES
[facility]    Progress Note       Patient Name: Rema Cortés  :  1990  MRN:  [id number]          Subjective  Postpartum Day 1:     The patient feels tired.  Her pain is well controlled with prescribed pain medications.   She is ambulating well.  Patient describes her bleeding as moderate lochia.    Breastfeeding: with difficulty.           /73 (BP Location: Left arm, Patient Position: Sitting)   Pulse 73   Temp 98.6 °F (37 °C) (Oral)   Resp 16   LMP 2021 (Exact Date)   SpO2 98%   Breastfeeding Unknown     Review of Systems   Constitutional: Positive for activity change.   Gastrointestinal: Positive for abdominal pain.   Genitourinary: Positive for vaginal bleeding.   Psychiatric/Behavioral: Positive for self-injury.   All other systems reviewed and are negative.    Physical Exam:    Physical Exam  Vitals and nursing note reviewed.   Constitutional:       Appearance: She is well-developed.   HENT:      Head: Normocephalic and atraumatic.   Eyes:      General: No scleral icterus.     Conjunctiva/sclera: Conjunctivae normal.   Neck:      Thyroid: No thyromegaly.   Abdominal:      General: There is no distension.      Palpations: Abdomen is soft. There is no mass.      Tenderness: There is no abdominal tenderness. There is no guarding or rebound.      Hernia: No hernia is present.      Comments: Inc C/D/I,    Musculoskeletal:      Cervical back: Neck supple.      Right lower leg: No edema.      Left lower leg: No edema.   Skin:     General: Skin is warm and dry.   Neurological:      Mental Status: She is alert and oriented to person, place, and time.   Psychiatric:         Behavior: Behavior normal.         Thought Content: Thought content normal.         Judgment: Judgment normal.           Lab results reviewed:  Yes.   Results from last 7 days   Lab Units 06/10/22  0450   HEMOGLOBIN g/dL 10.8*     Infant: male  Feeding:breast  Rh status: positive, Rhogam was not indicated  Rubella:  Immune  Contraception: undecided              1) POD#1: Progressing well. Hgb:10.8, start daily iron    2) GHTN- BPS 140s/80s and 24 hour urine normal. No antihypertensives.     3) D/C IV    4) Desires circ- R/B/A d/w pt and all questions answered    5) Dispo: home on Sunday if well.         Delfina Villanueva MD    6/10/2022  08:34 EDT

## 2022-06-10 NOTE — PLAN OF CARE
Goal Outcome Evaluation:  VSS, pain well controlled without use of narcotics.  Bonding well with infant, attempting to breastfeed with supplementation

## 2022-06-10 NOTE — ANESTHESIA POSTPROCEDURE EVALUATION
Patient: Rema Cortés    Procedure Summary     Date: 22 Room / Location: [facility] LABOR DELIVERY 1 / [facility] LABOR DELIVERY    Anesthesia Start:  Anesthesia Stop: 22    Procedure:  SECTION PRIMARY (N/A Abdomen) Diagnosis: (failure to progress)    Surgeons: Milana Thompson DO Provider: Sagrario Cochran MD    Anesthesia Type: epidural ASA Status: 2          Anesthesia Type: epidural    Vitals  Vitals Value Taken Time   /83 06/10/22 0500   Temp 99.4 °F (37.4 °C) 06/10/22 0500   Pulse 81 06/10/22 0500   Resp 18 06/10/22 0500   SpO2 98 % 06/10/22 0500           Post Anesthesia Care and Evaluation    Patient location during evaluation: bedside  Patient participation: complete - patient participated  Level of consciousness: awake and alert  Pain score: 4  Pain management: satisfactory to patient    Airway patency: patent  Anesthetic complications: No anesthetic complications  PONV Status: none  Cardiovascular status: acceptable  Respiratory status: acceptable  Hydration status: acceptable    Comments: Late entry, patient seen post op in room.

## 2022-06-10 NOTE — PLAN OF CARE
Problem: Adult Inpatient Plan of Care  Goal: Plan of Care Review  Outcome: Ongoing, Progressing  Flowsheets  Taken 6/10/2022 0656 by Gosia Valencia, RN  Outcome Evaluation: VSS. Fundus firm, lochia scant. Voiding, ambulating.  Breastfeeding and bottle supplementing. Bonding well with infant.  Taken 6/9/2022 1822 by Magi Sheets, RN  Progress: improving  Plan of Care Reviewed With: patient   Goal Outcome Evaluation:  Plan of Care Reviewed With: patient        Progress: improving  Outcome Evaluation: VSS. Fundus firm, lochia scant. Voiding, ambulating.  Breastfeeding and bottle supplementing. Bonding well with infant.

## 2022-06-10 NOTE — PAYOR COMM NOTE
"Rema Cortés (32 y.o. Female)       [facility]   [address] [place] [address]  Facility NPI: [id number]    [facility]  Fax: [phone]  Phone: [phone] (Martina: 3371)  Subject: Maternity AUTH REQUEST   Reference #: [id number]  Please don't hesitate to contact me with any questions or concerns.                Date of Birth   1990    Social Security Number       Address   [address] [place], [place] [address]    Home Phone   [phone]    MRN   [id number]       Yazidi   None    Marital Status                               Admission Date   6/8/22    Admission Type   Elective    Admitting Provider   Kamran Kamara MD    Attending Provider   Kamran Kamara MD    Department, Room/Bed   [facility] OB GYN, 1123/1       Discharge Date       Discharge Disposition       Discharge Destination                               Attending Provider: Kamran Kamara MD    Allergies: Benadryl [Diphenhydramine]    Isolation: None   Infection: None   Code Status: CPR   Advance Care Planning Activity    Ht: 157.5 cm (62.01\")   Wt: 60 kg (132 lb 3.2 oz)    Admission Cmt: None   Principal Problem: None                Active Insurance as of 6/8/2022     Primary Coverage     Payor Plan Insurance Group Employer/Plan Group    [facility] BL[employer] [facility] PPO [id number]     Payor Plan Address Payor Plan Phone Number Payor Plan Fax Number Effective Dates    [address] [phone]  3/16/2020 - None Entered    [place] [id number]       Subscriber Name Subscriber Birth Date Member ID       KELSIE DANIEL 6/9/1987 [id number]                 Emergency Contacts      (Rel.) Home Phone Work Phone Mobile Phone    KELSIE DANIEL (Spouse) [phone] -- [phone]               History & Physical      Milana Thompson DO at 06/09/22 0528              Patient Care Team:  Aj Sen MD as PCP - General " (Family Medicine)    Chief complaint Failure to descend       HPI: 32-year-old G1, P0 presented yesterday at 40 weeks and 3 days for induction of labor due to past due date.  Patient's pregnancy has been complicated by some elevated blood pressures.  She had a 24-hour urine collected during the pregnancy and resulted at 88 mg of protein on 2022.  Patient has a history of drug abuse and is on Subutex 8 mg daily.  Her last growth ultrasound was at 37 weeks and the baby was in the 40th percentile.  Her GBBS is negative.  On admission she was found to be fingertip dilated.  A Cervidil was placed.  Patient had spontaneous rupture membranes on Cervidil and it was removed.  Patient was 1 cm dilated when the Cervidil was removed.  Pitocin was started and patient made very slow change.  She ultimately received her epidural and quickly became 5 cm.  She has been pushing for over an hour and has made no fetal descent.  The baby's fetal heart rate tracing is overall reassuring but is having some deep variables.  The urine is now turning bloody from trauma from the fetal head.  Discussed with patient that we would proceed with a primary  section due to failure to descend.  Patient father the baby agree with plan and all questions answered.    Debilities: None    Emotional Behavior: Appropriate    PMH:   Past Medical History:   Diagnosis Date   • HPV (human papilloma virus) infection 2008   • LGSIL on Pap smear of cervix 2008   • Migraine    • Ovarian cyst          PSH: History reviewed. No pertinent surgical history.    SoHx:   Social History     Socioeconomic History   • Marital status:    Tobacco Use   • Smoking status: Current Every Day Smoker     Packs/day: 0.50   • Smokeless tobacco: Never Used   Vaping Use   • Vaping Use: Never used   Substance and Sexual Activity   • Alcohol use: No   • Drug use: No   • Sexual activity: Yes     Partners: Male       FHx:   Family History   Problem Relation  Age of Onset   • Depression Mother    • Hyperlipidemia Father    • Hypertension Father          Allergies: Benadryl [diphenhydramine]    Medications:   No current facility-administered medications on file prior to encounter.     Current Outpatient Medications on File Prior to Encounter   Medication Sig Dispense Refill   • gabapentin (NEURONTIN) 800 MG tablet Take 800 mg by mouth 2 (Two) Times a Day.     • buprenorphine (SUBUTEX) 8 MG sublingual tablet SL tablet TAKE 2 TABLET BELOW THE TONGUE DAILY (DISSOLVE UNDER TONGUE-DO NOT SWALLOW)     • cyclobenzaprine (FLEXERIL) 10 MG tablet      • docusate sodium (COLACE) 100 MG capsule Take 100 mg by mouth 2 (Two) Times a Day.     • doxylamine (UNISOM) 25 MG tablet Take 25 mg by mouth At Night As Needed for Sleep.     • Prenatal Vit-Fe Fumarate-FA (prenatal vitamin 27-0.8) 27-0.8 MG tablet tablet Take 1 tablet by mouth Daily.     • SUMAtriptan (IMITREX) 50 MG tablet Take 50 mg by mouth.     • vitamin B-6 (PYRIDOXINE) 50 MG tablet Take 50 mg by mouth Daily.               Vital Signs  Temp:  [97.9 °F (36.6 °C)-98.6 °F (37 °C)] 98.6 °F (37 °C)  Heart Rate:  [] 102  Resp:  [16-18] 16  BP: (112-160)/(58-97) 129/82    Physical Exam:  General: NAD  Heart:: RRR  Lungs: clear  Abdomen: gravid. +FHTs  Genitalia: 10/100/-1  Extremities: no calf tenderness. +SCDx  Psychological: AAOx3      Labs: hgb 14.2      Assessment/Plan: 33yo  at 40.4 weeks with failure to descend. Proceed with primary LTCS        I discussed the patients findings and my recommendations with patient and family.     Milana Thompson DO  22  05:28 EDT        Electronically signed by Milana Thompson DO at 22 0533         Facility-Administered Medications as of 2022   Medication Dose Route Frequency Provider Last Rate Last Admin   • [COMPLETED] azithromycin 500 mg IVPB in 250 mL NS  500 mg Intravenous Once Milana Thompson DO   500 mg at 22 0528   • buprenorphine (SUBUTEX) SL tablet 8  mg  8 mg Sublingual Daily Milana Thompson DO   8 mg at 06/09/22 0951   • carboprost (HEMABATE) injection 250 mcg  250 mcg Intramuscular Q15 Min PRN Kamran Kamara MD       • carboprost (HEMABATE) injection 250 mcg  250 mcg Intramuscular Q15 Min PRN Milana Thompson DO       • [COMPLETED] ceFAZolin Sodium-Dextrose (ANCEF) IVPB (duplex) 2 g  2 g Intravenous Once Milana Thompson DO   2 g at 06/09/22 0519   • [COMPLETED] dinoprostone (CERVIDIL) vaginal insert 10 mg  10 mg Vaginal Once Kamran Kamara MD   10 mg at 06/08/22 0659   • docusate sodium (COLACE) capsule 100 mg  100 mg Oral BID PRN Milana Thompson DO       • [COMPLETED] fentaNYL (2 mcg/mL) and bupivacaine (0.125%) in 100 mL NS 0.2-0.125-0.9 MG/100ML-% infusion solution  - ADS Override Pull            • [COMPLETED] fentanyl 2 mcg/ml and bupivacaine 0.125% epidural bolus from bag  7 mL Epidural Once Sagrario Cochran MD   3 mL at 06/08/22 1817   • gabapentin (NEURONTIN) capsule 800 mg  800 mg Oral Q12H Milana Thompson DO   800 mg at 06/09/22 2119   • [START ON 6/10/2022] HYDROcodone-acetaminophen (NORCO) 5-325 MG per tablet 1 tablet  1 tablet Oral Q4H PRN Milana Thompson DO       • [START ON 6/10/2022] HYDROcodone-acetaminophen (NORCO) 5-325 MG per tablet 2 tablet  2 tablet Oral Q4H PRN Milana Thompson DO       • HYDROmorphone (DILAUDID) injection 0.5 mg  0.5 mg Intravenous Q30 Min PRN Milana Thompson DO       • [START ON 6/10/2022] ibuprofen (ADVIL,MOTRIN) tablet 800 mg  800 mg Oral Q8H PRN Milana Thompson DO       • ketorolac (TORADOL) injection 30 mg  30 mg Intravenous Q6H Milana Thompson DO   30 mg at 06/09/22 2231   • [COMPLETED] lactated ringers bolus 1,000 mL  1,000 mL Intravenous Once US[facility]AN Kamran Kamara MD   Stopped at 06/08/22 0993   • [COMPLETED] lactated ringers bolus 1,000 mL  1,000 mL Intravenous Once Sagrario Cochran MD   Stopped at 06/08/22 1849   • lactated ringers infusion  125 mL/hr Intravenous  Continuous Kamran Kamara  mL/hr at 06/09/22 0950 125 mL/hr at 06/09/22 0950   • lactated ringers infusion  125 mL/hr Intravenous Continuous Milana Thompson  mL/hr at 06/09/22 1755 125 mL/hr at 06/09/22 1755   • methylergonovine (METHERGINE) injection 200 mcg  200 mcg Intramuscular Once PRN Kamran Kamara MD       • methylergonovine (METHERGINE) injection 200 mcg  200 mcg Intramuscular Once PRN Milana Thompson DO       • miSOPROStol (CYTOTEC) tablet 800 mcg  800 mcg Rectal Once PRN Kamran Kamara MD       • miSOPROStol (CYTOTEC) tablet 800 mcg  800 mcg Rectal Once PRN Milana Thompson DO       • naloxone (NARCAN) 0.4 mg in sodium chloride 0.9 % 1,000 mL infusion  0.4 mg Intravenous Continuous PRN Sagrario Cochran MD       • ondansetron (ZOFRAN) tablet 4 mg  4 mg Oral Q6H PRN Milana Thompson DO        Or   • ondansetron (ZOFRAN) injection 4 mg  4 mg Intravenous Q6H PRN Milana Thompson DO       • oxyCODONE-acetaminophen (PERCOCET) 5-325 MG per tablet 1 tablet  1 tablet Oral Q4H PRN Milana Thompson DO       • oxyCODONE-acetaminophen (PERCOCET) 7.5-325 MG per tablet 1 tablet  1 tablet Oral Q4H PRN Milana Thompson DO       • oxytocin (PITOCIN) 30 units in 0.9% sodium chloride 500 mL (premix)  650 mL/hr Intravenous Once Milana Thompson DO        Followed by   • oxytocin (PITOCIN) 30 units in 0.9% sodium chloride 500 mL (premix)  85 mL/hr Intravenous Once Milana Thompson DO       • oxytocin (PITOCIN) 30 units in 0.9% sodium chloride 500 mL (premix)  650 mL/hr Intravenous Once Milana Thompson DO        Followed by   • oxytocin (PITOCIN) 30 units in 0.9% sodium chloride 500 mL (premix)  85 mL/hr Intravenous Once Milana Thompson DO       • prenatal vitamin 27-0.8 tablet 1 tablet  1 tablet Oral Daily Milana Thompson,    1 tablet at 06/09/22 0952   • promethazine (PHENERGAN) tablet 25 mg  25 mg Oral Q6H PRN Milana Thompson,         Or   • promethazine  (PHENERGAN) suppository 12.5 mg  12.5 mg Rectal Q6H PRN Milana Thompson, DO       • simethicone (MYLICON) chewable tablet 80 mg  80 mg Oral 4x Daily PRN Milana Thompson, DO       • [] sodium chloride 0.9 % infusion  - ADS Override Pull            • Tranexamic Acid 1,000 mg in sodium chloride 0.9 % 100 mL  1,000 mg Intravenous Once PRN Milana Thompson, DO         Lab Results (last 72 hours)     Procedure Component Value Units Date/Time    Protein, Urine, 24 Hour - Urine, Catheter [[id number]]  (Abnormal) Collected: 22 1705    Specimen: 24 Hour Urine from Urine, Catheter Updated: 22 1745     Protein, 24H Urine 3.6 mg/24hours      24H Urine Volume 24 mL      Time (Hours) 24 hrs     Rapid Assay For ROM - Amniotic Fluid, Amniotic Sac [[id number]]  (Abnormal) Collected: 22 1037    Specimen: Amniotic Fluid from Amniotic Sac Updated: 22 1051     Rupture of Membranes Positive    COVID PRE-OP / PRE-PROCEDURE SCREENING ORDER (NO ISOLATION) - Swab, Nasal Cavity [[id number]]  (Normal) Collected: 22 06    Specimen: Swab from Nasal Cavity Updated: 22 0738    Narrative:      The following orders were created for panel order COVID PRE-OP / PRE-PROCEDURE SCREENING ORDER (NO ISOLATION) - Swab, Nasal Cavity.  Procedure                               Abnormality         Status                     ---------                               -----------         ------                     COVID-19,[facility]...[[id number]]  Normal              Final result                 Please view results for these tests on the individual orders.    COVID-19,[facility] IN-HOUSE,Nasal Swab No Transport Media 3-4 HR TAT - Swab, Nasal Cavity [[id number]]  (Normal) Collected: 22 06    Specimen: Swab from Nasal Cavity Updated: 22 0738     COVID19 Not Detected    Narrative:      Fact sheet for providers: https://www.fda.gov/media/498307/download     Fact sheet for patients:  https://www.fda.gov/media/060490/download    Test performed by PCR.    Consider negative results in combination with clinical observations, patient history, and epidemiological information.    CBC (No Diff) [[id number]]  (Abnormal) Collected: 06/08/22 0624    Specimen: Blood Updated: 06/08/22 0713     WBC 11.44 10*3/mm3      RBC 4.54 10*6/mm3      Hemoglobin 14.2 g/dL      Hematocrit 41.9 %      MCV 92.3 fL      MCH 31.3 pg      MCHC 33.9 g/dL      RDW 13.9 %      RDW-SD 47.5 fl      MPV 10.6 fL      Platelets 200 10*3/mm3     Urine Drug Screen - Urine, Clean Catch [[id number]]  (Abnormal) Collected: 06/08/22 0608    Specimen: Urine, Clean Catch Updated: 06/08/22 0707     THC, Screen, Urine Negative     Phencyclidine (PCP), Urine Negative     Cocaine Screen, Urine Negative     Methamphetamine, Ur Negative     Opiate Screen Negative     Amphetamine Screen, Urine Negative     Benzodiazepine Screen, Urine Negative     Tricyclic Antidepressants Screen Negative     Methadone Screen, Urine Negative     Barbiturates Screen, Urine Negative     Oxycodone Screen, Urine Negative     Propoxyphene Screen Negative     Buprenorphine, Screen, Urine Positive    Narrative:      Urine drug screen results are to be used for medical purposes only.  They are not to be used for legal purposes such as employment testing.  Negative results do not necessarily mean the complete absence of a subtance, but rather that the result is less than the cutoff for that substance.  Positive results are unconfirmed and considered Preliminary Positive.  [facility] does not automatically confirm Postitive Unconfirmed results.  The physician may request (order) an Unconfirmed Positive result to be sent out for confirmation.      Negative Thresholds for Drugs Screened:    THC screen, urine                          50 ng/ml  Phenycyclidine (PCP), urine                25 ng/ml  Cocaine screen, urine                     150 ng/ml  Methamphetamine,  "urine                    500 ng/ml  Opiate screen, urine                      100 ng/ml  Amphetamine screen, urine                 500 ng/ml  Benzodiazepine screen, urine              150 ng/ml  Tricyclic Antidepressants screen, urine   300 ng/ml  Methadone screen, urine                   200 ng/ml  Barbiturates screen, urine                200 ng/ml  Oxycodone screen, urine                   100 ng/ml  Propoxyphene screen, urine                300 ng/ml  Buprenorphine screen, urine                10 ng/ml          Imaging Results (Last 72 Hours)     ** No results found for the last 72 hours. **        Orders (last 72 hrs)      Start     Ordered    06/10/22 0600  Discontinue Indwelling Urinary Catheter in AM  Once         06/09/22 0639    06/10/22 0600  Hemoglobin & Hematocrit, Blood  Morning Draw         06/09/22 0639    06/10/22 0600  ibuprofen (ADVIL,MOTRIN) tablet 800 mg  Every 8 Hours PRN         06/09/22 0639    06/10/22 0600  HYDROcodone-acetaminophen (NORCO) 5-325 MG per tablet 1 tablet  Every 4 Hours PRN         06/09/22 0639    06/10/22 0600  HYDROcodone-acetaminophen (NORCO) 5-325 MG per tablet 2 tablet  Every 4 Hours PRN         06/09/22 0639    06/10/22 0000  Remove Abdominal Dressing  Once         06/09/22 0639    06/09/22 1705  Protein, Urine, 24 Hour - Urine, Catheter  Once         06/08/22 2053    06/09/22 1600  ketorolac (TORADOL) injection 30 mg  Every 6 Hours         06/09/22 1324    06/09/22 1030  oxytocin (PITOCIN) 30 units in 0.9% sodium chloride 500 mL (premix)  Once        \"Followed by\" Linked Group Details    06/09/22 0930    06/09/22 1015  lactated ringers infusion  Continuous         06/09/22 0929    06/09/22 1015  oxytocin (PITOCIN) 30 units in 0.9% sodium chloride 500 mL (premix)  Once        \"Followed by\" Linked Group Details    06/09/22 0929    06/09/22 1000  Respirations  Every Hour      Comments: If respiratory rate is less than 10/min, notify the Anesthesiologist    06/09/22 0929 " "   06/09/22 0931  Anesthesia Follow-Up  Once        Provider:  (Not yet assigned)    06/09/22 0930    06/09/22 0931  Notify Provider (Specified)  Until Discontinued         06/09/22 0930 06/09/22 0931  Vital Signs Per Hospital Policy  Per Hospital Policy         06/09/22 0930 06/09/22 0931  Strict Bed Rest  Until Discontinued         06/09/22 0930    06/09/22 0931  Fundal & Lochia Check  Per Order Details        Comments: Every 15 Minutes x4, Then Every 30 Minutes x2, Then Every Shift    06/09/22 0930    06/09/22 0931  Fundal & Lochia Check  Every Shift       06/09/22 0930    06/09/22 0931  Diet Regular  Diet Effective Now         06/09/22 0930 06/09/22 0931  I/O  Every Shift       06/09/22 0930 06/09/22 0931  Urinary Catheter Care  Every Shift       06/09/22 0930 06/09/22 0930  naloxone (NARCAN) 0.4 mg in sodium chloride 0.9 % 1,000 mL infusion  Continuous PRN         06/09/22 0930    06/09/22 0930  methylergonovine (METHERGINE) injection 200 mcg  Once As Needed         06/09/22 0930 06/09/22 0930  carboprost (HEMABATE) injection 250 mcg  Every 15 Minutes PRN         06/09/22 0930    06/09/22 0930  miSOPROStol (CYTOTEC) tablet 800 mcg  Once As Needed         06/09/22 0930 06/09/22 0930  Tranexamic Acid 1,000 mg in sodium chloride 0.9 % 100 mL  Once As Needed         06/09/22 0930 06/09/22 0930  oxytocin (PITOCIN) 30 units in 0.9% sodium chloride 500 mL (premix)  Once        \"Followed by\" Linked Group Details    06/09/22 0930 06/09/22 0930  Notify Physician (specified)  Until Discontinued         06/09/22 0929 06/09/22 0930  Vital Signs Per Hospital Policy  Per Hospital Policy         06/09/22 0929 06/09/22 0930  Fundal & Lochia Check  Per Order Details        Comments: Every 15 Minutes x4, Then Every 30 Minutes x2, Then Every Shift    06/09/22 0929    06/09/22 0930  Diet Regular  Diet Effective Now,   Status:  Canceled         06/09/22 0929 06/09/22 0930  Oxygen Therapy- Nasal " "Cannula; 2 LPM; Titrate for SPO2: 90% - 95%  Continuous         06/09/22 0929 06/09/22 0930  If Respiratory Rate is Less Than 8/Min, See Narcan Order. Notify Anesthesiologist STAT.  Continuous         06/09/22 0929 06/09/22 0930  Blood Pressure and Pulse Every 4 Hours  Continuous         06/09/22 0929 06/09/22 0930  Activity & Removal of Campbell Catheter, Per Obstetrician  Continuous         06/09/22 0929 06/09/22 0930  Notify Anesthesiologist for Any Questions / Problems  Continuous         06/09/22 0929 06/09/22 0930  Notify Anesthesia for Temp Over 101.4F  Continuous         06/09/22 0929 06/09/22 0929  oxytocin (PITOCIN) 30 units in 0.9% sodium chloride 500 mL (premix)  Once        \"Followed by\" Linked Group Details    06/09/22 0929 06/09/22 0929  ondansetron (ZOFRAN) tablet 4 mg  Every 6 Hours PRN        \"Or\" Linked Group Details    06/09/22 0929 06/09/22 0929  ondansetron (ZOFRAN) injection 4 mg  Every 6 Hours PRN        \"Or\" Linked Group Details    06/09/22 0929    06/09/22 0929  promethazine (PHENERGAN) tablet 25 mg  Every 6 Hours PRN        \"Or\" Linked Group Details    06/09/22 0929 06/09/22 0929  promethazine (PHENERGAN) suppository 12.5 mg  Every 6 Hours PRN        \"Or\" Linked Group Details    06/09/22 0929 06/09/22 0929  Fundal & Lochia Check  Every Shift       06/09/22 0929 06/09/22 0929  methylergonovine (METHERGINE) injection 200 mcg  Once As Needed         06/09/22 0929 06/09/22 0929  carboprost (HEMABATE) injection 250 mcg  Every 15 Minutes PRN         06/09/22 0929 06/09/22 0929  miSOPROStol (CYTOTEC) tablet 800 mcg  Once As Needed         06/09/22 0929 06/09/22 0900  sodium chloride 0.9 % flush 10 mL  Every 12 Hours Scheduled,   Status:  Discontinued         06/09/22 0442    06/09/22 0900  buprenorphine (SUBUTEX) SL tablet 8 mg  Daily         06/09/22 0639    06/09/22 0900  prenatal vitamin 27-0.8 tablet 1 tablet  Daily         06/09/22 0639 06/09/22 0900 "  gabapentin (NEURONTIN) capsule 800 mg  Every 12 Hours Scheduled         06/09/22 0639    06/09/22 0800  Ambulate Patient  2 Times Daily      Comments: After anesthesia wears off.    06/09/22 0639    06/09/22 0700  ketorolac (TORADOL) injection 30 mg  Every 6 Hours,   Status:  Discontinued         06/09/22 0609    06/09/22 0700  Turn Cough Deep Breathe  Every Hour       06/09/22 0639    06/09/22 0700  Incentive spirometry RT  Every Hour       06/09/22 0639    06/09/22 0638  docusate sodium (COLACE) capsule 100 mg  2 Times Daily PRN         06/09/22 0639    06/09/22 0638  Code Status and Medical Interventions:  Continuous         06/09/22 0639    06/09/22 0638  Vital Signs Per hospital policy  Per Hospital Policy         06/09/22 0639    06/09/22 0638  Notify Physician  Until Discontinued         06/09/22 0639    06/09/22 0638  Patient May Shower  Once        Comments: After anesthesia wears off and with assistance    06/09/22 0639    06/09/22 0638  Diet Regular  Diet Effective Now,   Status:  Canceled         06/09/22 0639    06/09/22 0638  Advance Diet as Tolerated  Until Discontinued         06/09/22 0639    06/09/22 0638  Fundal and Lochia Check  Per Hospital Policy        Comments: Q 30 min x 2, Q 1 hr x 4, Q 4 hrs x 24 hrs, then Q shift.    06/09/22 0639    06/09/22 0638  Weigh Patient  Once         06/09/22 0639    06/09/22 0638  Continue Indwelling Urinary Catheter Already in Place  Once         06/09/22 0639    06/09/22 0638  Notify Provider if Bladder Distention Continues  Until Discontinued         06/09/22 0639    06/09/22 0638  Breast pump to bed  Once         06/09/22 0639    06/09/22 0638  If indicated -- Please administer RH Immunoglobulin based on results of cord blood evaluation and fetal screen lab tests, pharmacy to dispense  Continuous        Comments: See process instructions for reference range details.    06/09/22 0639    06/09/22 0638  Place Sequential Compression Device  Once          06/09/22 0639    06/09/22 0638  Maintain Sequential Compression Device  Continuous         06/09/22 0639    06/09/22 0637  simethicone (MYLICON) chewable tablet 80 mg  4 Times Daily PRN         06/09/22 0639    06/09/22 0633  Transfer Patient  Once         06/09/22 0639    06/09/22 0608  HYDROmorphone (DILAUDID) injection 0.5 mg  Every 30 Minutes PRN         06/09/22 0609    06/09/22 0608  oxyCODONE-acetaminophen (PERCOCET) 7.5-325 MG per tablet 1 tablet  Every 4 Hours PRN         06/09/22 0609    06/09/22 0608  oxyCODONE-acetaminophen (PERCOCET) 5-325 MG per tablet 1 tablet  Every 4 Hours PRN         06/09/22 0609    06/09/22 0530  ceFAZolin Sodium-Dextrose (ANCEF) IVPB (duplex) 2 g  Once         06/09/22 0434    06/09/22 0530  azithromycin 500 mg IVPB in 250 mL NS  Once         06/09/22 0434    06/09/22 0530  lactated ringers infusion  Continuous         06/09/22 0442    06/09/22 0439  Vital Signs Per Hospital Policy  Per Hospital Policy,   Status:  Canceled         06/09/22 0442    06/09/22 0439  Continuous Fetal Monitoring With NST on Admission and Prior to Initiation of Oxytocin.  Per Order Details,   Status:  Canceled        Comments: Continuous Fetal Monitoring With NST on Admission & Prior to Initiation of Oxytocin.    06/09/22 0442    06/09/22 0439  External Uterine Contraction Monitoring  Per Hospital Policy,   Status:  Canceled         06/09/22 0442    06/09/22 0439  Notify Provider (Specified)  Until Discontinued,   Status:  Canceled         06/09/22 0442    06/09/22 0439  Notify Provider of Tachysystole (Per Hospital Algorithm)  Until Discontinued,   Status:  Canceled         06/09/22 0442    06/09/22 0439  Notify Provider if Membranes Ruptured, Bleeding Greater Than 1 Pad Per Hour, Fetal Heart Tone Abnormality or Severe Pain  Until Discontinued,   Status:  Canceled         06/09/22 0442    06/09/22 0439  Initiate Group Beta Strep (GBS) Prophylaxis Protocol, If Criteria Met  Continuous,   Status:   Canceled        Comments: NO TREATMENT RECOMMENDED IF: 1) Maternal GBS Status Known Negative 2) Scheduled  Birth With Intact Membranes, Not in Labor 3) Maternal GBS Status Unknown, No Risk Factors  TREAT WITH ANTIBIOTICS IF:  1) Maternal GBS Status Known Positive 2) Maternal GBS Status Unknown With Risk Factors: a)  Previous Infant Affected By GBS Infection b) GBS Urinary Tract Infection (UTI) or Bacteriuria During Pregnancy c) Unexplained Maternal Fever (100.4F (38C) or Greater) During Labor d)  Prolonged Rupture re of Membranes (18 or More Hours) e) Gestational Age Less Than 37 Weeks    22 0442    22 043  Abdominal Prep With Clippers  Once,   Status:  Canceled         22 0442    22 043  Chlorhexadine Skin Prep Unless Otherwise Indicated  Once,   Status:  Canceled         22 0442    22 043  SCD (Sequential Compression Devices)  Once         22 04422  POC Glucose Once  Once,   Status:  Canceled         22 0442    22 043  NPO Diet NPO Type: Ice Chips  Diet Effective Now,   Status:  Canceled         22 0442    22 043  Inpatient Anesthesiology Consult  Once,   Status:  Canceled        Specialty:  Anesthesiology  Provider:  (Not yet assigned)    22 0442    22  Type & Screen  Once,   Status:  Canceled         22 0442    22 043  CBC (No Diff)  STAT,   Status:  Canceled         22 0442    22 043  Insert Peripheral IV  Once,   Status:  Canceled         22 0442    22 043  Saline Lock & Maintain IV Access  Continuous,   Status:  Canceled         22 0442    22 043  Case Request  Once,   Status:  Canceled         22 0442    22 043  lidocaine PF 1% (XYLOCAINE) injection 5 mL  As Needed,   Status:  Discontinued         22 0442    22 043  sodium chloride 0.9 % flush 10 mL  As Needed,   Status:  Discontinued         22 0442    22  "0438  sodium chloride 0.9 % infusion  - ADS Override Pull        Note to Pharmacy: Created by cabinet override    06/09/22 0438    06/08/22 1845  lactated ringers bolus 1,000 mL  Once         06/08/22 1748    06/08/22 1845  fentanyl 2 mcg/ml and bupivacaine 0.125% epidural bolus from bag  Once        \"Followed by\" Linked Group Details    06/08/22 1748    06/08/22 1845  fentaNYL (2 mcg/mL) and bupivacaine (0.125%) in 100 mL NS epidural  Continuous,   Status:  Discontinued        \"Followed by\" Linked Group Details    06/08/22 1748    06/08/22 1750  fentaNYL (2 mcg/mL) and bupivacaine (0.125%) in 100 mL NS 0.2-0.125-0.9 MG/100ML-% infusion solution  - ADS Override Pull        Note to Pharmacy: Created by cabinet override    06/08/22 1750    06/08/22 1746  Vital Signs Per Anesthesia Guidelines  Continuous,   Status:  Canceled        Comments: Every 2 Minutes x5, Every 5 Minutes x4, then if Stable Every 15 Minutes    06/08/22 1748    06/08/22 1746  Cardiac Monitoring  Continuous,   Status:  Canceled         06/08/22 1748    06/08/22 1746  Fetal Heart Rate Monitor  Once,   Status:  Canceled         06/08/22 1748    06/08/22 1746  Nurse or anesthesiologist to remain with patient for 15 minutes following dosing.  Until Discontinued,   Status:  Canceled         06/08/22 1748    06/08/22 1746  Facilitate maternal postion on side and maintain uterine displacement.  Until Discontinued,   Status:  Canceled         06/08/22 1748    06/08/22 1746  Consult anesthesia services prior to changing epidural infusion/rate.  Until Discontinued,   Status:  Canceled         06/08/22 1748    06/08/22 1746  Insert Indwelling Urinary Catheter  Once,   Status:  Canceled        \"And\" Linked Group Details    06/08/22 1748    06/08/22 1746  Assess Need for Indwelling Urinary Catheter - Follow Removal Protocol  Continuous,   Status:  Canceled        Comments: Indwelling Urinary Catheter Removal Criteria  Discontinue Indwelling Urinary Catheter " "Unless One of the Following is Present:  Urinary Retention or Obstruction  Chronic Urinary Catheter Use  End of Life  Critical Illness with Strict I/O   Tract or Abdominal Surgery  Stage 3/4 Sacral / Perineal Wound  Required Activity Restriction: Trauma  Required Activity Restriction: Spine Surgery  If Patient is Being Followed by Urology Contact Them PRIOR to Removal  Do Not Remove Indwelling Urinary Catheter er Order is Present with a CLINICAL REASON to Maintain the Catheter. Provider is Required to Include a Clinical Reason to Maintain a Urinary Catheter    Patient Admitted With Indwelling Urinary Catheter (Not Placed at [facility])  Assess for Continued Need & Document Medical Necessity  If Infection is Suspected, Contact the Provider       \"And\" Linked Group Details    06/08/22 1748    06/08/22 1746  Urinary Catheter Care  Every Shift,   Status:  Canceled      \"And\" Linked Group Details    06/08/22 1748    06/08/22 1746  Notify physician for the following conditions:  Until Discontinued,   Status:  Canceled         06/08/22 1748    06/08/22 1746  Transfer to postpartum when discharge criteria met.  Until Discontinued         06/08/22 1748    06/08/22 1745  ePHEDrine injection 10 mg  Every 10 Minutes PRN,   Status:  Discontinued         06/08/22 1748    06/08/22 1559  Opioid Administration - Capnography (EtCO2) Monitoring  Per Order Details,   Status:  Canceled         06/08/22 1558    06/08/22 1559  Opioid Administration - Document EtCO2 Value With Each Set of Vitals & Any Change in Patient Status  Per Order Details,   Status:  Canceled         06/08/22 1558    06/08/22 1559  Opioid Administration - Notify Provider Capnography (EtCO2)  Until Discontinued,   Status:  Canceled         06/08/22 1558    06/08/22 1559  Opioid Administration - Continuous Pulse Oximetry (SpO2) Monitoring  Per Order Details,   Status:  Canceled         06/08/22 1558    06/08/22 1559  Opioid Administration - Document SpO2 Value " With Each Set of Vitals & Any Change in Patient Status  Per Order Details,   Status:  Canceled         06/08/22 1558    06/08/22 1559  Opioid Administration - Notify Provider Pulse Oximetry (SpO2)  Until Discontinued,   Status:  Canceled         06/08/22 1558    06/08/22 1555  Call MD with BP greater than 160/100.  Nursing Communication  Continuous        Comments: Call MD with BP greater than 160/100.    06/08/22 1555    06/08/22 1516  fentaNYL citrate (PF) (SUBLIMAZE) injection 100 mcg  Every 1 Hour PRN,   Status:  Discontinued         06/08/22 1516    06/08/22 1200  oxytocin (PITOCIN) 30 units in 0.9% sodium chloride 500 mL (premix)  Titrated,   Status:  Discontinued         06/08/22 1158    06/08/22 1159  Diet Clear Liquid  Diet Effective Now,   Status:  Canceled         06/08/22 1158    06/08/22 1033  Rapid Assay For ROM - Amniotic Fluid, Amniotic Sac  STAT         06/08/22 1032    06/08/22 0900  sodium chloride 0.9 % flush 10 mL  Every 12 Hours Scheduled,   Status:  Discontinued         06/08/22 0545    06/08/22 0730  dinoprostone (CERVIDIL) vaginal insert 10 mg  Once         06/08/22 0635    06/08/22 0721  Diet Regular  Diet Effective Now,   Status:  Canceled         06/08/22 0720    06/08/22 0721  ABO RH Specimen Verification  STAT         06/08/22 0721    06/08/22 0645  lactated ringers infusion  Continuous,   Status:  Discontinued         06/08/22 0545    06/08/22 0546  Urine Drug Screen - Urine, Clean Catch  Once         06/08/22 0545    06/08/22 0545  COVID PRE-OP / PRE-PROCEDURE SCREENING ORDER (NO ISOLATION) - Swab, Nasal Cavity  Once         06/08/22 0545    06/08/22 0545  COVID-19,[employer] IN-HOUSE,Nasal Swab No Transport Media 3-4 HR TAT - Swab, Nasal Cavity  PROCEDURE ONCE         06/08/22 0545    06/08/22 0544  VTE Prophylaxis Not Indicated: Reduced Mobility (3); </= 3 (Low Risk)  Once         06/08/22 0545    06/08/22 0544  Diet Clear Liquid  Diet Effective Now,   Status:  Canceled          22 0545    22 0540  Admit To Obstetrics Inpatient  Once         22 0545    22 0540  Vital Signs Per hospital policy  Per Hospital Policy,   Status:  Canceled         22 0545    22 0540  Mini- prep prior to delivery  Once,   Status:  Canceled         22 0545    22 0540  Continuous Fetal Monitoring With NST on Admission and Prior to Initiation of Oxytocin.  Per Order Details,   Status:  Canceled        Comments: Continuous Fetal Monitoring With NST on Admission & Prior to Initiation of Oxytocin.    22 0545    22 0540  External Uterine Contraction Monitoring  Per Hospital Policy,   Status:  Canceled         22 0545    22 0540  Notify Physician (specified)  Until Discontinued,   Status:  Canceled         22 0545    22 0540  Notify physician for hyperstimulus (per hospital algorithm)  Until Discontinued,   Status:  Canceled         22 0545    22 0540  Notify physician if membranes ruptured, bleeding greater than 1 pad an hour, fetal heart tone abnormality, and severe pain  Until Discontinued,   Status:  Canceled         22 0545    22 0540  Bed Rest with Bathroom Privileges  Once,   Status:  Canceled         22 0545    22 0540  Cervical Exam  Once,   Status:  Canceled        Comments: Unless contraindicated, every 1-2 hours in active labor, or at nurse's discretion.    22 0545    22 0540  Initiate Group Beta Strep (GBS) Prophylaxis Protocol, If Criteria Met  Continuous,   Status:  Canceled        Comments: NO TREATMENT RECOMMENDED IF: 1)  Maternal GBS status known negative 2)  Scheduled  birth with intact membranes, not in labor.  3 ) Maternal GBS unknown, no risk factors.   TREAT WITH ANTIBIOTICS IF:  1)  Maternal GBS status is known postive.  2)  Maternal GBS status unknown with these risk factors:  a)  Previous infant affected by GBS infection.  b)  GBS urinary tract infection  "(UTI) or bacteruria during pregnancy  c)  Unexplained maternal fever in labor (greater than or equal to 100.4F o  or 38.0C)  d)  Prolonged rupture of the membranes greater than or equal to 18 hours.  e)  Gestational age less than 37 weeks.    06/08/22 0545    06/08/22 0540  CBC (No Diff)  Once         06/08/22 0545    06/08/22 0540  Type & Screen  Once         06/08/22 0545    06/08/22 0540  Insert Peripheral IV  Once,   Status:  Canceled         06/08/22 0545    06/08/22 0540  Saline Lock & Maintain IV Access  Continuous,   Status:  Canceled         06/08/22 0545    06/08/22 0539  Position change  As Needed,   Status:  Canceled      Comments: For intra-uterine resuscitation for hypertonus, hypertstimulation, or non-reassuring fetal status    06/08/22 0545    06/08/22 0539  lidocaine PF 1% (XYLOCAINE) injection 5 mL  As Needed,   Status:  Discontinued         06/08/22 0545    06/08/22 0539  sodium chloride 0.9 % flush 10 mL  As Needed,   Status:  Discontinued         06/08/22 0545    06/08/22 0539  lactated ringers bolus 1,000 mL  Once As Needed         06/08/22 0545    06/08/22 0539  acetaminophen (TYLENOL) tablet 650 mg  Every 4 Hours PRN,   Status:  Discontinued         06/08/22 0545    06/08/22 0539  HYDROmorphone (DILAUDID) injection 0.5 mg  Every 2 Hours PRN,   Status:  Discontinued         06/08/22 0545    06/08/22 0539  promethazine (PHENERGAN) suppository 12.5 mg  Every 6 Hours PRN,   Status:  Discontinued        \"Or\" Linked Group Details    06/08/22 0545    06/08/22 0539  promethazine (PHENERGAN) tablet 12.5 mg  Every 6 Hours PRN,   Status:  Discontinued        \"Or\" Linked Group Details    06/08/22 0545    06/08/22 0539  terbutaline (BRETHINE) injection 0.25 mg  As Needed,   Status:  Discontinued         06/08/22 0545    Unscheduled  IV Site Care  As Needed       06/09/22 0929    Unscheduled  Blood Gas, Arterial -  As Needed       06/09/22 0929    Unscheduled  Up with Assistance  As Needed       06/09/22 " 639    Unscheduled  Bladder Scan if Patient Unable to Void 4-6 Hours After Catheter Removal  As Needed         22    Unscheduled  Straight Cath Every 4-6 Hours As Needed If Patient is Unable to Void After 4-6 Hours, Bladder Scan Volume is Greater Than 350-500mL & Patient Has Symptoms of Bladder Discomfort / Distention  As Needed       22    Unscheduled  Schedule / Prompt Voiding For Patients With Urinary Incontinence  As Needed       22    Unscheduled  Abdominal Wound Care  As Needed      Comments: Postop day 1. Remove dressing and leave incision open to air.    22    Unscheduled  Warm compress  As Needed       22    Unscheduled  Apply ace wrap, tight bra, or binder  As Needed       22    Unscheduled  Apply ice packs  As Needed       22    --  Prenatal Vit-Fe Fumarate-FA (prenatal vitamin 27-0.8) 27-0.8 MG tablet tablet  Daily         22    --  vitamin B-6 (PYRIDOXINE) 50 MG tablet  Daily         22    --  doxylamine (UNISOM) 25 MG tablet  Nightly PRN         22    --  docusate sodium (COLACE) 100 MG capsule  2 Times Daily         22                   Operative/Procedure Notes (last 72 hours)      Milana Thompson DO at 22          [facility]   Section Operative Note    Pre-Operative Dx:   1) 32 y.o.   2) IUP at 40.4 weeks  3) Indications for  section: Failure to descend   4) Elevated BP  5) Hx of opioid abuse and currently on Subutex      Postoperative dx:    1.  Same     Procedure: , Low Transverse    Surgeon: Milana Thompson DO      Assistant: Marcelina Owens   Anesthesia: epidural   EBL: 800 mls.           IV Fluids: 686 mls.   UOP: 425 mls.    I/O this shift:  In: 686 [IV Piggyback:500]  Out: 1225 [Urine:425; Blood:800]   Antibiotics: cefazolin (Ancef) and Zithromax     Infant:      Time of Delivery     Gender: male  infant    Weight: No birth weight  on file.     Apgars: 8  @ 1 minute /     9  @ 5 minutes      Meconium:   Nuchal Cord:    no  yes            Indication for C/Section: Failure to descned                                     Procedure Details:    32-year-old G1, P0 presented yesterday at 40 weeks and 3 days for induction of labor due to past due date.  Patient's pregnancy has been complicated by some elevated blood pressures.  She had a 24-hour urine collected during the pregnancy and resulted at 88 mg of protein on 2022.  Patient has a history of drug abuse and is on Subutex 8 mg daily.  Her last growth ultrasound was at 37 weeks and the baby was in the 40th percentile.  Her GBBS is negative.  On admission she was found to be fingertip dilated.  A Cervidil was placed.  Patient had spontaneous rupture membranes on Cervidil and it was removed.  Patient was 1 cm dilated when the Cervidil was removed.  Pitocin was started and patient made very slow change.  She ultimately received her epidural and quickly became 5 cm.  She has been pushing for over an hour and has made no fetal descent.  The baby's fetal heart rate tracing is overall reassuring but is having some deep variables.  The urine is now turning bloody from trauma from the fetal head.  Discussed with patient that we would proceed with a primary  section due to failure to descend.  Patient father the baby agree with plan and all questions answered.     Once all questions were answered, the patient was given IV antibiotics for ID prophylaxis. Pt was taken to the OR where spinal anesthesia was administered. A pearson catheter was placed and hung to gravity for the duration of the procedure. SCD's were placed on the lower extremities bilaterally for DVT prophylaxis. Once the pt was prepped and draped and anesthesia was found to be adequate, a Pfannensteil skin incision was made on the abdomen and carried down to the fascia. The fascia was incised and extended with Cole scissors. Kocher  clamps were placed on the superior and inferior aspect of the fascia and the rectus abdominal muscles were sharply and bluntly taken down. The rectus abdominal muscles were  in the midline and the periteoneum was entered and bluntly extended. A bladder blade was then inserted and a bladder flap was created. A low transverse incision was made on the uterus and bluntly extended. Artificial rupture of membranes was performed with clear fluid noted. The fetal head was delivered and a nuchal cord was reduced. This was followed by the rest of the body. Cord was clamped and cut and baby taken to the warmer. Cord blood was collected and the placenta was delivered. The uterus was exteriorized and cleared of all clots and debris. The uterine incision was repaired in 2 layers. The first layer was a running and locked fashion with 0-Vicryl and the second layer was an imbricating repair with 0-Vicryl suture. The uterus was firm and hemostatic. The abdomen was irrigated and aspirated with warm normal saline. The uterus was placed back into the abdomen. The fascia was reapproximated with 0-vicryl suture. The subcutaneous layer was irrigated and cauterized as needed for hemostasis. The skin was reapproximated with 4-0 vicryl. Pt tolerated the procedure well. Mom and baby are stable and progressing well.      Complications:   None      Disposition:   Mother to Mother Baby/Postpartum  in stable condition currently.   Baby to remains with mom  in stable condition currently.    Assistant: Marcelina Owens CSA was responsible for performing the following activities: Retraction, Suction, Irrigation, Closing and Placing Dressing and their skilled assistance was necessary for the success of this case.       Milana Thompson DO  6/9/2022  06:40 EDT      Electronically signed by Milana Thompson DO at 06/09/22 0644         Physician Progress Notes (last 48 hours)  Notes from 06/07/22 8113 through 06/09/22 4983   No notes of this  type exist for this encounter.       Consult Notes (last 72 hours)  Notes from 06/06/22 2349 through 06/09/22 2349   No notes of this type exist for this encounter.

## 2022-06-10 NOTE — PROGRESS NOTES
Patient: Rema Cortés  Procedure(s):   SECTION PRIMARY  Anesthesia type: epidural    Patient location: Labor and Delivery  Last vitals:   Vitals:    06/10/22 0500   BP: 137/83   Pulse: 81   Resp: 18   Temp: 99.4 °F (37.4 °C)   SpO2: 98%     Level of consciousness: awake, alert and oriented    Post-anesthesia pain: adequate analgesia  Airway patency: patent  Respiratory: unassisted  Cardiovascular: stable and blood pressure at baseline  Hydration: euvolemic    Anesthetic complications: no

## 2022-06-11 VITALS
TEMPERATURE: 98.2 F | DIASTOLIC BLOOD PRESSURE: 82 MMHG | RESPIRATION RATE: 20 BRPM | HEART RATE: 87 BPM | OXYGEN SATURATION: 98 % | SYSTOLIC BLOOD PRESSURE: 125 MMHG

## 2022-06-11 PROCEDURE — 0503F POSTPARTUM CARE VISIT: CPT | Performed by: OBSTETRICS & GYNECOLOGY

## 2022-06-11 RX ADMIN — Medication 1 CAPSULE: at 08:40

## 2022-06-11 RX ADMIN — BUPRENORPHINE 16 MG: 8 TABLET SUBLINGUAL at 08:40

## 2022-06-11 RX ADMIN — GABAPENTIN 800 MG: 400 CAPSULE ORAL at 08:40

## 2022-06-11 RX ADMIN — PRENATAL VIT W/ FE FUMARATE-FA TAB 27-0.8 MG 1 TABLET: 27-0.8 TAB at 08:40

## 2022-06-11 RX ADMIN — IBUPROFEN 800 MG: 800 TABLET, FILM COATED ORAL at 01:12

## 2022-06-11 RX ADMIN — DOCUSATE SODIUM 100 MG: 100 CAPSULE, LIQUID FILLED ORAL at 08:40

## 2022-06-11 NOTE — DISCHARGE SUMMARY
Obstetrical Discharge Form    Primary OB Clinician: DEDE      Preadmission Diagnosis:  1. Rema Cortés is a 32 y.o.  with IUP @ 40w4d   2. IOL for GHTN  3. H/O drug use, on Subutex      Discharge Diagnosis:  Same, plus:   4. Failure to descend  5. S/P 1 LTCS  6. Expected blood loss anemia    Antepartum complications: see above    Date of Delivery:  2022     Delivered By:      Delivery Type: primary  section, low transverse incision    Tubal Ligation: n/a    Baby: Liveborn male, Apgars 8/9, weight 6 #, 8 oz,   104.2  oz    Anesthesia: epidural    Intrapartum complications: Failure to Progress    Laceration: n/a    Feeding method: both breast and bottle -     Hospital Course: Rema Cortés is a 32 y.o.   who presented with an IUP 40w4d for IOL for GHNT and postdates. She progressed to complete dilation and pushed for over an hour with no descent of the fetal head. She had an uncomplicated 1 LTCS. Her 24 hour urine was repeated and was normal. She remained normotensive postpartum. On POD # 2, her infant was transferred for MAGNUS and she desired discharge home. She does not want any medications prescribed as she has done well on Subutex and Motrin and has those at home. She plans on using condoms for contraception.     Discharge Date: 2022; Discharge Time: 10:46 EDT    Review of Systems   Constitutional: Positive for activity change and fatigue.   Gastrointestinal: Positive for abdominal pain.   Genitourinary: Positive for vaginal bleeding.   All other systems reviewed and are negative.    /82 (BP Location: Left arm, Patient Position: Standing)   Pulse 87   Temp 98.2 °F (36.8 °C) (Oral)   Resp 20   LMP 2021 (Exact Date)   SpO2 98%   Breastfeeding Unknown      Physical Exam  Vitals and nursing note reviewed.   Constitutional:       Appearance: She is well-developed.   HENT:      Head: Normocephalic and atraumatic.   Eyes:      General: No scleral icterus.     Conjunctiva/sclera:  Conjunctivae normal.   Neck:      Thyroid: No thyromegaly.   Abdominal:      General: Bowel sounds are normal. There is no distension.      Palpations: Abdomen is soft. There is no mass.      Tenderness: There is no abdominal tenderness. There is no guarding or rebound.      Hernia: No hernia is present.      Comments: C/D/I   Musculoskeletal:      Cervical back: Neck supple.   Skin:     General: Skin is warm and dry.   Neurological:      Mental Status: She is alert and oriented to person, place, and time.   Psychiatric:         Behavior: Behavior normal.         Thought Content: Thought content normal.         Judgment: Judgment normal.         Results from last 7 days   Lab Units 06/10/22  0450   HEMOGLOBIN g/dL 10.8*     Infant: male  Feeding:both  Rh status: positive, Rhogam was not indicated  Rubella: Immune  Diabetes: no  Contraception: condoms        Plan:    Patient given written instruction sheet.  Follow-up appointment with [facility] in 2 weeks.    Discharge time was less than 30 minutes.     Delfina Villanueva MD  10:46 EDT  6/11/2022

## 2022-06-12 NOTE — CASE MANAGEMENT/SOCIAL WORK
Case Management Discharge Note      Final Note: Discharge home         Selected Continued Care - Discharged on 6/11/2022 Admission date: 6/8/2022 - Discharge disposition: Home or Self Care    Destination    No services have been selected for the patient.              Durable Medical Equipment    No services have been selected for the patient.              Dialysis/Infusion    No services have been selected for the patient.              Home Medical Care    No services have been selected for the patient.              Therapy    No services have been selected for the patient.              Community Resources    No services have been selected for the patient.              Community & DME    No services have been selected for the patient.                       Final Discharge Disposition Code: 01 - home or self-care

## 2022-06-28 ENCOUNTER — POSTPARTUM VISIT (OUTPATIENT)
Dept: OBSTETRICS AND GYNECOLOGY | Facility: CLINIC | Age: 32
End: 2022-06-28

## 2022-06-28 VITALS
WEIGHT: 113.8 LBS | SYSTOLIC BLOOD PRESSURE: 118 MMHG | BODY MASS INDEX: 19.43 KG/M2 | HEIGHT: 64 IN | DIASTOLIC BLOOD PRESSURE: 74 MMHG

## 2022-06-28 PROCEDURE — 0503F POSTPARTUM CARE VISIT: CPT | Performed by: OBSTETRICS & GYNECOLOGY

## 2022-06-28 NOTE — PROGRESS NOTES
"Postpartum Note    Chief Complaint: Postpartum Visit    HPI      Date of delivery:  22    The patient feels well. Patient describes her bleeding as no bleeding.     Breastfeeding: without difficulty.    Review of Systems   Constitutional: Negative for appetite change, chills, fatigue, fever and unexpected weight change.   Gastrointestinal: Negative for abdominal distention, abdominal pain, anal bleeding, blood in stool, constipation, diarrhea, nausea and vomiting.   Genitourinary: Negative for dyspareunia, dysuria, menstrual problem, pelvic pain, vaginal bleeding, vaginal discharge and vaginal pain.       The following portions of the patient's history were reviewed and updated as appropriate: allergies, current medications and problem list.             /74   Ht 162.6 cm (64\")   Wt 51.6 kg (113 lb 12.8 oz)   Breastfeeding Yes   BMI 19.53 kg/m²       Physical Exam  Constitutional:       General: She is not in acute distress.     Appearance: Normal appearance. She is not ill-appearing, toxic-appearing or diaphoretic.   Abdominal:      General: Abdomen is flat.      Palpations: Abdomen is soft.      Comments: Incision C/D/I   Neurological:      General: No focal deficit present.      Mental Status: She is alert and oriented to person, place, and time. Mental status is at baseline.      Motor: No weakness.      Gait: Gait normal.   Psychiatric:         Mood and Affect: Mood normal.         Behavior: Behavior normal.         Thought Content: Thought content normal.         Judgment: Judgment normal.               31yo  s/p primary LTCS    1) POD#22: Progressing well.     2) Postpartum Care: Breastfeeding. Denies PP depression/anxiety    3) Gyn HM: Check pap smear    4) Contraception: condoms    5) FU 4 weeks          Milana Thompson DO  2022  13:14 EDT    "

## 2022-08-02 ENCOUNTER — POSTPARTUM VISIT (OUTPATIENT)
Dept: OBSTETRICS AND GYNECOLOGY | Facility: CLINIC | Age: 32
End: 2022-08-02

## 2022-08-02 VITALS
WEIGHT: 113.8 LBS | BODY MASS INDEX: 19.43 KG/M2 | DIASTOLIC BLOOD PRESSURE: 78 MMHG | SYSTOLIC BLOOD PRESSURE: 120 MMHG | HEIGHT: 64 IN

## 2022-08-02 PROCEDURE — 0503F POSTPARTUM CARE VISIT: CPT | Performed by: OBSTETRICS & GYNECOLOGY

## 2022-08-02 NOTE — PROGRESS NOTES
"Postpartum Note    Chief Complaint: Postpartum Visit    HPI      Date of delivery: 22    The patient feels well. Patient describes her bleeding as no bleeding.     Breastfeeding: Breast and Bottle feeding. Struggling with breastfeeding and asking for note to get Medela pump by insurance.    Review of Systems   Constitutional: Negative for appetite change, chills, fatigue, fever and unexpected weight change.   Gastrointestinal: Negative for abdominal distention, abdominal pain, anal bleeding, blood in stool, constipation, diarrhea, nausea and vomiting.   Genitourinary: Negative for dyspareunia, dysuria, menstrual problem, pelvic pain, vaginal bleeding, vaginal discharge and vaginal pain.       The following portions of the patient's history were reviewed and updated as appropriate: allergies, current medications and problem list.             /78   Ht 162.6 cm (64\")   Wt 51.6 kg (113 lb 12.8 oz)   Breastfeeding Yes   BMI 19.53 kg/m²       Physical Exam  Constitutional:       General: She is not in acute distress.     Appearance: Normal appearance. She is not ill-appearing, toxic-appearing or diaphoretic.   Abdominal:      General: There is no distension.      Palpations: Abdomen is soft.      Tenderness: There is no abdominal tenderness.      Comments: Incision C/D/I   Neurological:      General: No focal deficit present.      Mental Status: She is alert and oriented to person, place, and time. Mental status is at baseline.      Motor: No weakness.      Gait: Gait normal.   Psychiatric:         Mood and Affect: Mood normal.         Behavior: Behavior normal.         Thought Content: Thought content normal.         Judgment: Judgment normal.               31yo  s/p primary LTCS    1) POD#22: Progressing well.     2) Postpartum Care: Breastfeeding. Denies PP depression/anxiety.    3) Gyn HM: LPS 12/3/21    4) Contraception: condoms    5) Hx of drug use: Has been on Subutex 8mg po bid x . Baby " had MAGNUS.     6) FU 1 year          Milana Thompson DO  8/2/2022  13:48 EDT

## 2023-02-02 NOTE — PROGRESS NOTES
OB follow up     Rema Cortés is a 33 y.o.  36  being seen today for her obstetrical visit.  Patient reports no bleeding, no leaking and occasional contractions. Fetal movement: normal    Review of Systems  No bleeding, No cramping/contractions     /84   Wt 58.8 kg (129 lb 9.6 oz)   LMP 2021 (Exact Date)   BMI 23.70 kg/m²     FHT:   BPM   Uterine Size:         Assessment/Plan:    1) 33 y.o.  -pregnancy at 36     2)   Encounter Diagnoses   Name Primary?   • Pregnancy complicated by subutex maintenance, antepartum (HCC) Yes   • Encounter for  screening, unspecified        3) Reviewed this stage of pregnancy  4) Problem list updated     Return in about 1 week (around 2022) for BPP/NST, OB INT.    I spent 20 minutes caring for Rema on this date of service. This time includes time spent by me in the following activities: preparing for the visit, reviewing tests, obtaining and/or reviewing a separately obtained history, performing a medically appropriate examination and/or evaluation, counseling and educating the patient/family/caregiver and documenting information in the medical record.      Kamran Kamara MD    2023  12:57 EST

## 2024-06-25 ENCOUNTER — OFFICE VISIT (OUTPATIENT)
Dept: OBSTETRICS AND GYNECOLOGY | Facility: CLINIC | Age: 34
End: 2024-06-25
Payer: COMMERCIAL

## 2024-06-25 VITALS
HEIGHT: 62 IN | BODY MASS INDEX: 22.93 KG/M2 | SYSTOLIC BLOOD PRESSURE: 118 MMHG | WEIGHT: 124.6 LBS | DIASTOLIC BLOOD PRESSURE: 84 MMHG

## 2024-06-25 DIAGNOSIS — Z79.899 HIGH RISK MEDICATION USE: ICD-10-CM

## 2024-06-25 DIAGNOSIS — F17.210 CIGARETTE SMOKER: ICD-10-CM

## 2024-06-25 DIAGNOSIS — Z98.891 S/P CESAREAN SECTION: ICD-10-CM

## 2024-06-25 DIAGNOSIS — O99.320 PREGNANCY COMPLICATED BY SUBUTEX MAINTENANCE, ANTEPARTUM: ICD-10-CM

## 2024-06-25 DIAGNOSIS — N92.6 MISSED MENSES: Primary | ICD-10-CM

## 2024-06-25 DIAGNOSIS — F11.20 PREGNANCY COMPLICATED BY SUBUTEX MAINTENANCE, ANTEPARTUM: ICD-10-CM

## 2024-06-25 DIAGNOSIS — O09.521 MULTIGRAVIDA OF ADVANCED MATERNAL AGE IN FIRST TRIMESTER: ICD-10-CM

## 2024-06-25 PROBLEM — O26.843 SIZE OF FETUS INCONSISTENT WITH DATES IN THIRD TRIMESTER: Status: RESOLVED | Noted: 2022-05-09 | Resolved: 2024-06-25

## 2024-06-25 PROBLEM — IMO0002 NUCHAL FOLD THICKENING DETERMINED BY ULTRASOUND: Status: RESOLVED | Noted: 2021-11-15 | Resolved: 2024-06-25

## 2024-06-25 LAB
B-HCG UR QL: POSITIVE
EXPIRATION DATE: ABNORMAL
INTERNAL NEGATIVE CONTROL: ABNORMAL
INTERNAL POSITIVE CONTROL: ABNORMAL
Lab: ABNORMAL

## 2024-06-25 PROCEDURE — 99213 OFFICE O/P EST LOW 20 MIN: CPT | Performed by: NURSE PRACTITIONER

## 2024-06-25 PROCEDURE — 81025 URINE PREGNANCY TEST: CPT | Performed by: NURSE PRACTITIONER

## 2024-06-25 NOTE — PROGRESS NOTES
Confirmation of pregnancy     Chief Complaint   Patient presents with    Possible Pregnancy         Rema Cortés is being seen today for evaluation of absence of menses. Due to her period being late, she tested for pregnancy and had + home UPT. She is a 34 y.o. . This problem is new to me, the examiner.       OB History          2    Para   1    Term   1       0    AB   0    Living   1         SAB        IAB        Ectopic        Molar        Multiple   0    Live Births   1                HPI     LNMP: 2024  Confident with date: Yes  Taking prenatal vitamins: Yes. Needs RX: No  Planned pregnancy: Yes  Prior obstetric issues, potential pregnancy concerns: C/S x 1- FTP (6#8oz)  Family history of genetic issues (includes FOB): no  Varicella Hx: virus  Flu vaccine: no  COVID 19 vaccine: yes. Booster vaccine: yes  History of STDs: no  Current medications: PNV, subutex (8-9 years- Dr. John Puckett), gabapentin (weaning off), stopped Flexeril, Imitrex (takes 1-2 times/month for migraines); Unisom/B6- nausea  Last pap smear: 2021  Smoker: Yes;smokes 1 ppd- trying to quit  Drug or alcohol abuse: Yes; 2014- opiates  H/O physical, emotional or sexual abuse: no  H/O mental health disorder: depression in early 20's; tried few different medications; feels stable today  Prior testing for Cystic Fibrosis Carrier or Sickle Cell Trait- yes, CF/FX/SMA neg in previous pregnancy  Prepregnancy BMI - Body mass index is 22.79 kg/m².    Past Medical History:   Diagnosis Date    HPV (human papilloma virus) infection 2008    LGSIL on Pap smear of cervix 2008    Migraine     Ovarian cyst        Past Surgical History:   Procedure Laterality Date     SECTION N/A 2022    Procedure:  SECTION PRIMARY;  Surgeon: Milana Thompson DO;  Location: [facility] LABOR DELIVERY;  Service: Obstetrics/Gynecology;  Laterality: N/A;         Current Outpatient Medications:     buprenorphine  "(SUBUTEX) 8 MG sublingual tablet SL tablet, TAKE 2 TABLET BELOW THE TONGUE DAILY (DISSOLVE UNDER TONGUE-DO NOT SWALLOW), Disp: , Rfl:     doxylamine (UNISOM) 25 MG tablet, Take 25 mg by mouth At Night As Needed for Sleep., Disp: , Rfl:     gabapentin (NEURONTIN) 800 MG tablet, Take 800 mg by mouth 2 (Two) Times a Day., Disp: , Rfl:     Prenatal Vit-Fe Fumarate-FA (prenatal vitamin 27-0.8) 27-0.8 MG tablet tablet, Take 1 tablet by mouth Daily., Disp: , Rfl:     SUMAtriptan (IMITREX) 50 MG tablet, Take 50 mg by mouth., Disp: , Rfl:     vitamin B-6 (PYRIDOXINE) 50 MG tablet, Take 50 mg by mouth Daily., Disp: , Rfl:     Allergies   Allergen Reactions    Benadryl [Diphenhydramine] Other (See Comments)     Altered mental state in childhood       Social History     Socioeconomic History    Marital status:    Tobacco Use    Smoking status: Every Day     Current packs/day: 1.00     Types: Cigarettes    Smokeless tobacco: Never   Vaping Use    Vaping status: Never Used   Substance and Sexual Activity    Alcohol use: No    Drug use: Not Currently     Types: Opium    Sexual activity: Yes     Partners: Male       Family History   Problem Relation Age of Onset    Depression Mother     Hyperlipidemia Father     Hypertension Father        Review of systems     Review of Systems   Gastrointestinal:  Positive for nausea. Negative for vomiting.   Genitourinary:  Positive for menstrual problem. Negative for vaginal bleeding.         Objective    /84   Ht 157.5 cm (62\")   Wt 56.5 kg (124 lb 9.6 oz)   LMP 04/22/2024 (Exact Date)   BMI 22.79 kg/m²     Physical Exam  Vitals and nursing note reviewed.   Constitutional:       General: She is awake. She is not in acute distress.     Appearance: She is not ill-appearing.   Eyes:      Conjunctiva/sclera: Conjunctivae normal.   Pulmonary:      Effort: Pulmonary effort is normal. No respiratory distress.   Musculoskeletal:      Cervical back: Neck supple. No rigidity.   Skin:     " General: Skin is warm and dry.      Capillary Refill: Capillary refill takes less than 2 seconds.   Neurological:      Mental Status: She is alert and oriented to person, place, and time.   Psychiatric:         Mood and Affect: Mood and affect normal.         Behavior: Behavior normal.         Assessment/Plan      Missed menses: + UPT in office. LNMP 2024 = 9w1d week EGA with an EDC=2025. BS US confirms IUP with +FCA: Yes. Oriented to practice.     Pregnancy: Disc importance of regular prenatal care. Enc PNV daily. Counseled on providers and on call phone. Disc Tylenol products are ok and encouraged no ibuprofen or ASA in pregnancy.  Disc exercise in pregnancy, diet, expected weight gain, etc. Enc no use of tobacco, vaping, drugs, or alcohol during pregnancy. Rev warn s/s of SAB.     Labs: Pt counseled on genetic screening, Quad screen, AFP, and NIPS.     BMI is within normal parameters. No other follow-up for BMI required.    Smoker- 1 ppd; trying to cut back. During this visit, approx 3-5 minutes counseling the patient regarding smoking cessation. PT COUNSELED TO QUIT SMOKING IN PREGNANCY.  She has been informed that cigarette smoking is associated with increased incidence of  birth, growth restriction, SIDS, et. Disc that smoking cessation is the single most important thing she can do to improve the pregnancy outcome.  She verbalized understanding to this discussion. Offered/declined ERX    6.   COVID19 precautions were reviewed with the patient. Continue to encourage good hand hygiene.  Hand hygeine performed before and after seeing the patient. Also encouraged COVID booster vaccine at 6 month interval from last COVID vaccine. She is s/p Covid vaccine    7.  Flu vaccine. Encouraged the flu vaccine during pregnancy. Discuss normal physiological changes during pregnancy increase the susceptibility of the flu virus and increase the risk of severe illness for the pregnant woman. Disc flu can be  harmful to the unborn baby as well. Enc the flu vaccine. Disc with patient that getting the flu vaccine is the first and most important step in protecting against the flu. Flu vaccines given during pregnancy help protect both the mother and the baby. Getting the flu vaccine during pregnancy also helps protect the  from flu illness for several months after their birth, when then are too young to get vaccinated. Also disc the importance of good hand hygiene and avoiding people who are sick.     8. Nausea- improved with Unisom/B6    9. AMA- The patient has been counseled regarding advanced maternal age in pregnancy. Disc that increased maternal age in pregnancy increases the risk for pregnancy complications such as gestational hypertension or gestational diabetes. Disc that pregnancy after the age of 35 also increases the risk for having a baby with a missing, damaged or extra chromosomes. Also disc that the risk of  labor,  birth and still birth are also higher. A consult with maternal fetal medicine has been offered. Information has been provided on optional screening tests including the Quad screen and cfDNA. The importance of regular prenatal care has been discussed.  Rec start asa at 12 wga    10. High risk medication use- on Subutex 8mg (x8-9 years- Dr. John Puckett), gabapentin (trying to wean off); discussed referral to [facility]    11. C/S x 1- FTP; desires repeat at 39wga    12. CF/FX/SMA neg in previous pregnancy    All questions answered.     Counseling was given to patient for the following topics: diagnostic results, instructions for management, risk factor reductions, prognosis, patient and family education, impressions, risks and benefits of treatment options, and importance of treatment compliance . Total time of the encounter was 25 minutes and 20 minutes was spent counseling.      Encounter Diagnoses   Name Primary?    Missed menses Yes    High risk medication use     Cigarette  smoker     Pregnancy complicated by subutex maintenance, antepartum     S/P  section     Multigravida of advanced maternal age in first trimester        Diagnoses and all orders for this visit:    Missed menses  -     POC Pregnancy, Urine    High risk medication use    Cigarette smoker    Pregnancy complicated by subutex maintenance, antepartum    S/P  section    Multigravida of advanced maternal age in first trimester        RTO in 2 weeks for new OB exam/Pap, labs and dating ultrasound.     Jolanta Castellanos, APRN  2024  15:42 EDT

## 2024-07-09 ENCOUNTER — INITIAL PRENATAL (OUTPATIENT)
Dept: OBSTETRICS AND GYNECOLOGY | Facility: CLINIC | Age: 34
End: 2024-07-09
Payer: COMMERCIAL

## 2024-07-09 VITALS — WEIGHT: 123.4 LBS | BODY MASS INDEX: 22.57 KG/M2 | DIASTOLIC BLOOD PRESSURE: 60 MMHG | SYSTOLIC BLOOD PRESSURE: 110 MMHG

## 2024-07-09 DIAGNOSIS — F17.210 CIGARETTE SMOKER: ICD-10-CM

## 2024-07-09 DIAGNOSIS — F11.20 PREGNANCY COMPLICATED BY SUBUTEX MAINTENANCE, ANTEPARTUM: ICD-10-CM

## 2024-07-09 DIAGNOSIS — Z34.00 INITIAL OBSTETRIC VISIT, ANTEPARTUM: Primary | ICD-10-CM

## 2024-07-09 DIAGNOSIS — O99.320 PREGNANCY COMPLICATED BY SUBUTEX MAINTENANCE, ANTEPARTUM: ICD-10-CM

## 2024-07-09 DIAGNOSIS — Z98.891 S/P CESAREAN SECTION: ICD-10-CM

## 2024-07-09 DIAGNOSIS — O09.521 MULTIGRAVIDA OF ADVANCED MATERNAL AGE IN FIRST TRIMESTER: ICD-10-CM

## 2024-07-09 DIAGNOSIS — Z11.51 SCREENING FOR HUMAN PAPILLOMAVIRUS (HPV): ICD-10-CM

## 2024-07-09 DIAGNOSIS — Z36.9 ENCOUNTER FOR ANTENATAL SCREENING, UNSPECIFIED: ICD-10-CM

## 2024-07-09 DIAGNOSIS — Z79.899 HIGH RISK MEDICATION USE: ICD-10-CM

## 2024-07-09 LAB
BILIRUB BLD-MCNC: NEGATIVE MG/DL
CLARITY, POC: CLEAR
COLOR UR: YELLOW
EXTERNAL NIPT: NEGATIVE
GLUCOSE UR STRIP-MCNC: NEGATIVE MG/DL
KETONES UR QL: NEGATIVE
LEUKOCYTE EST, POC: NEGATIVE
NITRITE UR-MCNC: NEGATIVE MG/ML
PH UR: 5 [PH] (ref 5–8)
PROT UR STRIP-MCNC: NEGATIVE MG/DL
RBC # UR STRIP: NEGATIVE /UL
SP GR UR: 1 (ref 1–1.03)
UROBILINOGEN UR QL: NORMAL

## 2024-07-09 NOTE — PROGRESS NOTES
Initial OB Visit     Chief Complaint   Patient presents with    Initial Prenatal Visit       Rema Cortés is being seen today for her first obstetrical visit.  She is a 34 y.o.    11w1d gestation. This problem is new to me: no      OB History          2    Para   1    Term   1       0    AB   0    Living   1         SAB        IAB        Ectopic        Molar        Multiple   0    Live Births   1          Obstetric Comments   C/S x 1- FTP (6#8oz)- infant admitted to NICU for MAGNUS (mom on subutex)               LNMP: 2024  Confident with date: Yes  Taking prenatal vitamins: Yes  Planned pregnancy: Yes  Prior obstetric issues, potential pregnancy concerns: C/S x 1- FTP (6#8oz)- infant admitted to NICU for MAGNUS (mom on subutex)  Family history of genetic issues (includes FOB): no  Prior infections concerning in pregnancy (Rash, fever in last 2 weeks): no; rash from chiggers  Varicella Hx: yes  Flu vaccine: no  COVID Vaccine: yes and booster  History of STDs: no  Current medications: PNV, subutex (8-9 years- Dr. John Puckett), gabapentin (weaning off), stopped Flexeril, Imitrex (takes 1-2 times/month for migraines); Unisom/B6- nausea   Last pap smear: 2021  Smoker: Yes, 1 ppd- trying to quit  Drug or alcohol abuse: Yes, opoid use - 2014  H/O Physical, mental or sexual abuse: no  H/O mental health disorder: depression in early 20's; tried several medications in the past; feels stable today   Prior testing for Cystic Fibrosis Carrier or Sickle Cell Trait- yes, CF/FX/SMA neg in previous pregnancy   Prepregnancy BMI: Body mass index is 22.57 kg/m².      Past Medical History:   Diagnosis Date    HPV (human papilloma virus) infection 2008    LGSIL on Pap smear of cervix 2008    Migraine     Ovarian cyst        Past Surgical History:   Procedure Laterality Date     SECTION N/A 2022    Procedure:  SECTION PRIMARY;  Surgeon: Milana Thompson DO;  Location:   [facility] LABOR DELIVERY;  Service: Obstetrics/Gynecology;  Laterality: N/A;         Current Outpatient Medications:     buprenorphine (SUBUTEX) 8 MG sublingual tablet SL tablet, TAKE 2 TABLET BELOW THE TONGUE DAILY (DISSOLVE UNDER TONGUE-DO NOT SWALLOW), Disp: , Rfl:     doxylamine (UNISOM) 25 MG tablet, Take 25 mg by mouth At Night As Needed for Sleep., Disp: , Rfl:     gabapentin (NEURONTIN) 800 MG tablet, Take 800 mg by mouth 2 (Two) Times a Day., Disp: , Rfl:     Prenatal Vit-Fe Fumarate-FA (prenatal vitamin 27-0.8) 27-0.8 MG tablet tablet, Take 1 tablet by mouth Daily., Disp: , Rfl:     SUMAtriptan (IMITREX) 50 MG tablet, Take 50 mg by mouth., Disp: , Rfl:     vitamin B-6 (PYRIDOXINE) 50 MG tablet, Take 50 mg by mouth Daily., Disp: , Rfl:     Allergies   Allergen Reactions    Benadryl [Diphenhydramine] Other (See Comments)     Altered mental state in childhood       Social History     Socioeconomic History    Marital status:    Tobacco Use    Smoking status: Every Day     Current packs/day: 1.00     Types: Cigarettes    Smokeless tobacco: Never   Vaping Use    Vaping status: Never Used   Substance and Sexual Activity    Alcohol use: No    Drug use: Not Currently     Types: Opium    Sexual activity: Yes     Partners: Male       Family History   Problem Relation Age of Onset    Depression Mother     Hyperlipidemia Father     Hypertension Father        Review of systems     All other systems reviewed and are negative except for: Gastrointestinal: positive for nausea     Objective    /60   Wt 56 kg (123 lb 6.4 oz)   LMP 04/22/2024 (Exact Date)   BMI 22.57 kg/m²     General Appearance:    Alert, cooperative, in no acute distress, habitus normal   Head:    Normocephalic, without obvious abnormality, atraumatic   Neck:   No adenopathy, supple, trachea midline, no thyromegaly   Lungs:     Clear to auscultation,respirations regular, even and     unlabored    Heart:    Regular rhythm and normal rate, normal S1  and S2, no       murmur, no gallop, no rub, no click   Breast Exam:    Deferred   Abdomen:     Deferred   Genitalia:    Vulva - BUS-WNL, NEFG    Vagina - No discharge, No bleeding    Cervix - No Lesions, closed     Uterus - Consistent with 11 weeks    Adnexa - No mass, NT    Pelvimetry - clinically adequate, gynecoid pelvis     Extremities:   Moves all extremities well, no edema, no cyanosis, no        redness   Skin:   No bleeding, bruising or rash   Lymph nodes:   No palpable adenopathy   Neurologic:   Sensation intact, A&O times 3         Assessment/Plan    1) Pregnancy at 11w1d- US IMP: single, viable, 1st trimester IUP is seen (GS w YS and fetus with CA). OV WNL. No FF nor masses seen in the adnexa nor CDS. US findings discussed with pt. EDC established 1/27/2025 and confirmed by LMP.     2) OB exam: OB exam completed: Yes. New OB bag provided Yes. Pap collected: Yes. Provided list of safe medications to take while in pregnancy.    3) Labs: OB labs collected: Yes. Counseled on genetic carrier screening (CF/SMA/FX): No; she was tested in a previous pregnancy.  Counseled on NIPS: Yes, she desires today. Counseled on Quad/AFP: Yes, she is undecided.    4) BMI is within normal parameters. No other follow-up for BMI required.       5)  Prenatal care: Oriented to the office and prenatal care. Encourage prenatal vitamins. Disc Tylenol products are fine, avoid aspirin and ibuprofen; Zika (travel restrictions/ok to use insect repellant); not to change cat litter; food restrictions; exercise; avoidance of alcohol, tobacco, drugs and saunas/hot tubs.     6) S/p Covid vaccine: yes, and booster; S/p Flu vaccine: no    7) CF/FX/SMA neg in previous pregnancy     8) Nausea- improved with Unisom/B6     9) AMA- The patient has been counseled regarding advanced maternal age in pregnancy. Disc that increased maternal age in pregnancy increases the risk for pregnancy complications such as gestational hypertension or gestational  diabetes. Disc that pregnancy after the age of 35 also increases the risk for having a baby with a missing, damaged or extra chromosomes. Also disc that the risk of  labor,  birth and still birth are also higher. A consult with maternal fetal medicine has been offered. Information has been provided on optional screening tests including the Quad screen and cfDNA. The importance of regular prenatal care has been discussed.  Rec start asa at 12 wga.  Referral placed to [facility].     10) H/o drug use- on Subutex 8mg (x8-9 years- Dr. John Puckett), gabapentin (trying to wean off); referral placed to [facility]; monitor growth 3rd trimester     11) C/S x 1- FTP; desires repeat at 39wga    12) Smoker- 1 ppd; trying to cut back. During this visit, approx 3-5 minutes counseling the patient regarding smoking cessation. PT COUNSELED TO QUIT SMOKING IN PREGNANCY.  She has been informed that cigarette smoking is associated with increased incidence of  birth, growth restriction, SIDS, et. Disc that smoking cessation is the single most important thing she can do to improve the pregnancy outcome.  She verbalized understanding to this discussion. Offered/declined ERX.      All questions answered.     I spent 30 minutes caring for Rema on this date of service. This time includes time spent by me in the following activities: preparing for the visit, reviewing tests, performing a medically appropriate examination and/or evaluation, counseling and educating the patient/family/caregiver, referring and communicating with other health care professionals, documenting information in the medical record, and ordering test(s).  This time does not include time spent performing ultrasound.    RTO 4 weeks for OBT, AFP     Jolanta Castellanos, APRN    7/10/2024  13:49 EDT

## 2024-07-10 LAB
ABO GROUP BLD: NORMAL
BASOPHILS # BLD AUTO: 0 X10E3/UL (ref 0–0.2)
BASOPHILS NFR BLD AUTO: 0 %
BLD GP AB SCN SERPL QL: NEGATIVE
EOSINOPHIL # BLD AUTO: 0.1 X10E3/UL (ref 0–0.4)
EOSINOPHIL NFR BLD AUTO: 1 %
ERYTHROCYTE [DISTWIDTH] IN BLOOD BY AUTOMATED COUNT: 13.5 % (ref 11.7–15.4)
HBA1C MFR BLD: 5.3 % (ref 4.8–5.6)
HBV SURFACE AG SERPL QL IA: NEGATIVE
HCT VFR BLD AUTO: 40.2 % (ref 34–46.6)
HCV IGG SERPL QL IA: NON REACTIVE
HGB A MFR BLD ELPH: 96.9 % (ref 96.4–98.8)
HGB A2 MFR BLD ELPH: 3.1 % (ref 1.8–3.2)
HGB BLD-MCNC: 13.8 G/DL (ref 11.1–15.9)
HGB F MFR BLD ELPH: 0 % (ref 0–2)
HGB FRACT BLD-IMP: NORMAL
HGB S MFR BLD ELPH: 0 %
HIV 1+2 AB+HIV1 P24 AG SERPL QL IA: NON REACTIVE
IMM GRANULOCYTES # BLD AUTO: 0 X10E3/UL (ref 0–0.1)
IMM GRANULOCYTES NFR BLD AUTO: 0 %
LYMPHOCYTES # BLD AUTO: 2.9 X10E3/UL (ref 0.7–3.1)
LYMPHOCYTES NFR BLD AUTO: 31 %
MCH RBC QN AUTO: 30.8 PG (ref 26.6–33)
MCHC RBC AUTO-ENTMCNC: 34.3 G/DL (ref 31.5–35.7)
MCV RBC AUTO: 90 FL (ref 79–97)
MONOCYTES # BLD AUTO: 0.4 X10E3/UL (ref 0.1–0.9)
MONOCYTES NFR BLD AUTO: 4 %
NEUTROPHILS # BLD AUTO: 5.8 X10E3/UL (ref 1.4–7)
NEUTROPHILS NFR BLD AUTO: 64 %
PLATELET # BLD AUTO: 258 X10E3/UL (ref 150–450)
RBC # BLD AUTO: 4.48 X10E6/UL (ref 3.77–5.28)
RH BLD: POSITIVE
RPR SER QL: NON REACTIVE
RUBV IGG SERPL IA-ACNC: 2.05 INDEX
VZV IGG SER IA-ACNC: 640 INDEX
WBC # BLD AUTO: 9.1 X10E3/UL (ref 3.4–10.8)

## 2024-07-11 LAB
AMPHETAMINES UR QL SCN: NEGATIVE NG/ML
BACTERIA UR CULT: NORMAL
BACTERIA UR CULT: NORMAL
BARBITURATES UR QL SCN: NEGATIVE NG/ML
BENZODIAZ UR QL SCN: NEGATIVE NG/ML
BUPRENORPHINE UR QL: POSITIVE NG/ML
BZE UR QL SCN: NEGATIVE NG/ML
CANNABINOIDS UR QL SCN: NEGATIVE NG/ML
CREAT UR-MCNC: 122.5 MG/DL (ref 20–300)
FENTANYL UR-MCNC: NEGATIVE PG/ML
LABORATORY COMMENT REPORT: ABNORMAL
MEPERIDINE UR QL: NEGATIVE NG/ML
METHADONE UR QL SCN: NEGATIVE NG/ML
OPIATES UR QL SCN: NEGATIVE NG/ML
OXYCODONE+OXYMORPHONE UR QL SCN: NEGATIVE NG/ML
PCP UR QL: NEGATIVE NG/ML
PH UR: 6.7 [PH] (ref 4.5–8.9)
PROPOXYPH UR QL SCN: NEGATIVE NG/ML
TRAMADOL UR QL SCN: NEGATIVE NG/ML

## 2024-07-12 PROBLEM — R82.5 POSITIVE URINE DRUG SCREEN: Status: ACTIVE | Noted: 2024-07-12

## 2024-07-12 LAB
C TRACH RRNA CVX QL NAA+PROBE: NEGATIVE
CYTOLOGIST CVX/VAG CYTO: NORMAL
CYTOLOGY CVX/VAG DOC CYTO: NORMAL
CYTOLOGY CVX/VAG DOC THIN PREP: NORMAL
DX ICD CODE: NORMAL
HPV GENOTYPE REFLEX: NORMAL
HPV I/H RISK 4 DNA CVX QL PROBE+SIG AMP: NEGATIVE
Lab: NORMAL
N GONORRHOEA RRNA CVX QL NAA+PROBE: NEGATIVE
OTHER STN SPEC: NORMAL
STAT OF ADQ CVX/VAG CYTO-IMP: NORMAL
T VAGINALIS RRNA SPEC QL NAA+PROBE: NEGATIVE

## 2024-07-22 LAB
CITATION REF LAB TEST: NORMAL
ETHNIC BACKGROUND STATED: NORMAL
GENE DIS ANL CARRIER INTERP-IMP: NORMAL
GENE STUDIED ID: NORMAL
LAB DIRECTOR NAME PROVIDER: NORMAL
Lab: NORMAL
MOL DX INTERP BLD/T QL: NORMAL
REASON FOR REFERRAL (NARRATIVE): NORMAL
RECOMMENDATION PATIENT DOC-IMP: NORMAL
REF LAB TEST METHOD: NORMAL
SERVICE CMNT-IMP: NORMAL
SPECIMEN SOURCE: NORMAL

## 2024-08-07 ENCOUNTER — ROUTINE PRENATAL (OUTPATIENT)
Dept: OBSTETRICS AND GYNECOLOGY | Facility: CLINIC | Age: 34
End: 2024-08-07
Payer: COMMERCIAL

## 2024-08-07 VITALS — SYSTOLIC BLOOD PRESSURE: 122 MMHG | DIASTOLIC BLOOD PRESSURE: 76 MMHG | BODY MASS INDEX: 23.08 KG/M2 | WEIGHT: 126.2 LBS

## 2024-08-07 DIAGNOSIS — G43.109 MIGRAINE WITH AURA AND WITHOUT STATUS MIGRAINOSUS, NOT INTRACTABLE: ICD-10-CM

## 2024-08-07 DIAGNOSIS — Z79.899 HIGH RISK MEDICATION USE: ICD-10-CM

## 2024-08-07 DIAGNOSIS — O09.522 MULTIGRAVIDA OF ADVANCED MATERNAL AGE IN SECOND TRIMESTER: ICD-10-CM

## 2024-08-07 DIAGNOSIS — Z98.891 S/P CESAREAN SECTION: ICD-10-CM

## 2024-08-07 DIAGNOSIS — Z36.9 ENCOUNTER FOR ANTENATAL SCREENING, UNSPECIFIED: Primary | ICD-10-CM

## 2024-08-07 DIAGNOSIS — R82.5 POSITIVE URINE DRUG SCREEN: ICD-10-CM

## 2024-08-07 DIAGNOSIS — O99.320 PREGNANCY COMPLICATED BY SUBUTEX MAINTENANCE, ANTEPARTUM: ICD-10-CM

## 2024-08-07 DIAGNOSIS — F11.20 PREGNANCY COMPLICATED BY SUBUTEX MAINTENANCE, ANTEPARTUM: ICD-10-CM

## 2024-08-07 DIAGNOSIS — F17.210 CIGARETTE SMOKER: ICD-10-CM

## 2024-08-07 LAB
BILIRUB BLD-MCNC: NEGATIVE MG/DL
CLARITY, POC: CLEAR
COLOR UR: YELLOW
GLUCOSE UR STRIP-MCNC: NEGATIVE MG/DL
KETONES UR QL: NEGATIVE
LEUKOCYTE EST, POC: NEGATIVE
NITRITE UR-MCNC: NEGATIVE MG/ML
PH UR: 5 [PH] (ref 5–8)
PROT UR STRIP-MCNC: NEGATIVE MG/DL
RBC # UR STRIP: NEGATIVE /UL
SP GR UR: 1 (ref 1–1.03)
UROBILINOGEN UR QL: NORMAL

## 2024-08-07 NOTE — PROGRESS NOTES
Chief Complaint   Patient presents with    Routine Prenatal Visit       HPI: 34 y.o.  at 15w2d . She feels better, nausea improved. She is smoking about 1/2 PPD now and is agreeable to starting Nicoderm patches. She is on ASA 81 mg daily. Her migraines are fine. She is on Subutex and Neurontin and trying to use them less frequently. She has an MFM appt next month.  No FM yet.This is the first time I am seeing this patient in this pregnancy and all of her problems are new to me, the examiner.       Relevant data reviewed:    Last OB US growth (since 3/24/2024)       None          Vitals:    24 1628   BP: 122/76   Weight: 57.2 kg (126 lb 3.2 oz)     Total weight gain for pregnancy:  Not found.    Cx exam:   / /        Review of systems:   Gen: negative  CV:     negative  GI: negative  :   negative  MS:    negative  Neuro: negative  Pul: negative    Physical Exam  Vitals and nursing note reviewed.   Constitutional:       Appearance: Normal appearance.   Abdominal:      General: There is no distension.      Palpations: Abdomen is soft. There is no mass.      Tenderness: There is no abdominal tenderness. There is no right CVA tenderness, left CVA tenderness, guarding or rebound.      Hernia: No hernia is present.   Skin:     General: Skin is warm and dry.   Neurological:      Mental Status: She is oriented to person, place, and time.   Psychiatric:         Mood and Affect: Mood normal.         Behavior: Behavior normal.         Thought Content: Thought content normal.         Judgment: Judgment normal.         A/P  1. 33 yo  @ 15w2d     2. Pregnancy Risk:  HIGH RISK    3. AMA- NIPT low risk. Check AFP    4. C/S x 1- for FTP, plan RLTCS at 39 weeks    5. H/O drug use- pt on Subutex 8 mg TID, tries to take BID. Also on Neurontin 800 mg BID to TID. Dr John Puckett prescribing. She has an appt with MFM at 20 weeks. Will need growth US q 4 weeks and ANT    6. CF/SMA/FX neg    7. H/O GHTN- on ASA 81 mg.  Check CMP, Pr:Cr ratio    8. Migraines- not bad    9. Patient was counseled for 4 minutes regarding effects of smoking in pregnancy, including risk of poor growth,  birth and stillbirth as well as an increase in asthma and ear infections in infancy.  Patient did voice a willingness to quit and information was given for review. She is currently smoking 1/2 PPD of tobacco. She did accept medication to help her quit.     10. RTO 4 weeks OBT and US anatomy ( if not done by [facility])   Diagnoses and all orders for this visit:    1. Encounter for  screening, unspecified (Primary)  -     POC Urinalysis Dipstick, Multipro  -     Alpha Fetoprotein, Maternal  -     Comprehensive Metabolic Panel  -     Protein / Creatinine Ratio, Urine - Urine, Clean Catch    2. Cigarette smoker    3. Migraine with aura and without status migrainosus, not intractable    4. Positive urine drug screen- + Buprenorphine    5. Multigravida of advanced maternal age in second trimester    6. S/P  section    7. Pregnancy complicated by subutex maintenance, antepartum    8. High risk medication use    Other orders  -     nicotine (Nicoderm CQ) 7 MG/24HR patch; Place 1 patch on the skin as directed by provider Daily.  Dispense: 14 each; Refill: 1        Pre-eclampsia symptoms were discussed and warnings were given.  Nutrition and weight gain were addressed.  -----------------------  PLAN:    RTO 4 weeks OBT and US anatomy.     Delfina Villanueva MD  2024 19:41 EDT

## 2024-08-08 LAB
CREAT UR-MCNC: 76 MG/DL
PROT UR-MCNC: 11.4 MG/DL
PROT/CREAT UR: 150 MG/G CREAT (ref 0–200)

## 2024-08-10 LAB
AFP INTERP SERPL-IMP: NORMAL
AFP INTERP SERPL-IMP: NORMAL
AFP MOM SERPL: 0.96
AFP SERPL-MCNC: 32.7 NG/ML
AGE AT DELIVERY: 37 YR
ALBUMIN SERPL-MCNC: 4 G/DL (ref 3.9–4.9)
ALP SERPL-CCNC: 68 IU/L (ref 44–121)
ALT SERPL-CCNC: 8 IU/L (ref 0–32)
AST SERPL-CCNC: 12 IU/L (ref 0–40)
BILIRUB SERPL-MCNC: <0.2 MG/DL (ref 0–1.2)
BUN SERPL-MCNC: 9 MG/DL (ref 6–20)
BUN/CREAT SERPL: 20 (ref 9–23)
CALCIUM SERPL-MCNC: 9.3 MG/DL (ref 8.7–10.2)
CHLORIDE SERPL-SCNC: 107 MMOL/L (ref 96–106)
CO2 SERPL-SCNC: 20 MMOL/L (ref 20–29)
CREAT SERPL-MCNC: 0.46 MG/DL (ref 0.57–1)
EGFRCR SERPLBLD CKD-EPI 2021: 127 ML/MIN/1.73
GA METHOD: NORMAL
GA: 15.2 WEEKS
GLOBULIN SER CALC-MCNC: 1.8 G/DL (ref 1.5–4.5)
GLUCOSE SERPL-MCNC: 92 MG/DL (ref 70–99)
IDDM PATIENT QL: NO
LABORATORY COMMENT REPORT: NORMAL
MULTIPLE PREGNANCY: NO
NEURAL TUBE DEFECT RISK FETUS: NORMAL %
POTASSIUM SERPL-SCNC: 3.5 MMOL/L (ref 3.5–5.2)
PROT SERPL-MCNC: 5.8 G/DL (ref 6–8.5)
RESULT: NORMAL
SODIUM SERPL-SCNC: 139 MMOL/L (ref 134–144)

## 2024-08-11 PROBLEM — Z87.59 HISTORY OF GESTATIONAL HYPERTENSION: Status: ACTIVE | Noted: 2024-08-11

## 2024-08-12 ENCOUNTER — TELEPHONE (OUTPATIENT)
Dept: OBSTETRICS AND GYNECOLOGY | Facility: CLINIC | Age: 34
End: 2024-08-12

## 2024-08-12 NOTE — TELEPHONE ENCOUNTER
Caller: BENEDICTO SWAN    Relationship to patient: SELF    Best call back number: [phone]        Type of visit: OB FOLLOW UP/ POSSIBLE US    Requested date: 8-12-24     If rescheduling, when is the original appointment: 09-10-24    Additional notes:PT IS REQUESTING TO RESCHEDULE DUE TO BEING OUT OF TOWN AT THAT TIME, PT IS REQUESTING AN APPT AFTER 9-16-24 AT ANY TIME OF THE DAY, PT WOULD LIKE A CALL BACK TO SCHEDULE

## 2024-09-03 ENCOUNTER — TRANSCRIBE ORDERS (OUTPATIENT)
Dept: ULTRASOUND IMAGING | Facility: HOSPITAL | Age: 34
End: 2024-09-03
Payer: COMMERCIAL

## 2024-09-03 DIAGNOSIS — Z79.899 HIGH RISK MEDICATION USE: Primary | ICD-10-CM

## 2024-09-03 DIAGNOSIS — O09.529 AMA (ADVANCED MATERNAL AGE) MULTIGRAVIDA 35+, UNSPECIFIED TRIMESTER: ICD-10-CM

## 2024-09-17 ENCOUNTER — ROUTINE PRENATAL (OUTPATIENT)
Dept: OBSTETRICS AND GYNECOLOGY | Facility: CLINIC | Age: 34
End: 2024-09-17
Payer: COMMERCIAL

## 2024-09-17 VITALS — DIASTOLIC BLOOD PRESSURE: 82 MMHG | BODY MASS INDEX: 23.7 KG/M2 | WEIGHT: 129.6 LBS | SYSTOLIC BLOOD PRESSURE: 132 MMHG

## 2024-09-17 DIAGNOSIS — Z87.59 HISTORY OF GESTATIONAL HYPERTENSION: ICD-10-CM

## 2024-09-17 DIAGNOSIS — Z98.891 S/P CESAREAN SECTION: ICD-10-CM

## 2024-09-17 DIAGNOSIS — Z34.92 PRENATAL CARE IN SECOND TRIMESTER: Primary | ICD-10-CM

## 2024-09-17 DIAGNOSIS — O09.522 MULTIGRAVIDA OF ADVANCED MATERNAL AGE IN SECOND TRIMESTER: ICD-10-CM

## 2024-09-17 DIAGNOSIS — F11.20 PREGNANCY COMPLICATED BY SUBUTEX MAINTENANCE, ANTEPARTUM: ICD-10-CM

## 2024-09-17 DIAGNOSIS — G43.109 MIGRAINE WITH AURA AND WITHOUT STATUS MIGRAINOSUS, NOT INTRACTABLE: ICD-10-CM

## 2024-09-17 DIAGNOSIS — Z36.9 ENCOUNTER FOR ANTENATAL SCREENING, UNSPECIFIED: ICD-10-CM

## 2024-09-17 DIAGNOSIS — O99.320 PREGNANCY COMPLICATED BY SUBUTEX MAINTENANCE, ANTEPARTUM: ICD-10-CM

## 2024-09-17 PROCEDURE — 99213 OFFICE O/P EST LOW 20 MIN: CPT | Performed by: NURSE PRACTITIONER

## 2024-09-19 ENCOUNTER — HOSPITAL ENCOUNTER (OUTPATIENT)
Dept: ULTRASOUND IMAGING | Facility: HOSPITAL | Age: 34
Discharge: HOME | End: 2024-09-19
Admitting: OBSTETRICS & GYNECOLOGY
Payer: COMMERCIAL

## 2024-09-19 ENCOUNTER — OFFICE VISIT (OUTPATIENT)
Dept: OBSTETRICS AND GYNECOLOGY | Facility: CLINIC | Age: 34
End: 2024-09-19
Payer: COMMERCIAL

## 2024-09-19 VITALS
DIASTOLIC BLOOD PRESSURE: 78 MMHG | HEIGHT: 62 IN | BODY MASS INDEX: 24.03 KG/M2 | HEART RATE: 78 BPM | SYSTOLIC BLOOD PRESSURE: 118 MMHG | TEMPERATURE: 98.9 F | WEIGHT: 130.6 LBS

## 2024-09-19 DIAGNOSIS — O99.320 PREGNANCY COMPLICATED BY SUBUTEX MAINTENANCE, ANTEPARTUM: Primary | ICD-10-CM

## 2024-09-19 DIAGNOSIS — Z79.899 HIGH RISK MEDICATION USE: ICD-10-CM

## 2024-09-19 DIAGNOSIS — O09.529 AMA (ADVANCED MATERNAL AGE) MULTIGRAVIDA 35+, UNSPECIFIED TRIMESTER: ICD-10-CM

## 2024-09-19 DIAGNOSIS — O09.522 MULTIGRAVIDA OF ADVANCED MATERNAL AGE IN SECOND TRIMESTER: ICD-10-CM

## 2024-09-19 DIAGNOSIS — F11.20 PREGNANCY COMPLICATED BY SUBUTEX MAINTENANCE, ANTEPARTUM: Primary | ICD-10-CM

## 2024-09-19 PROCEDURE — 76811 OB US DETAILED SNGL FETUS: CPT

## 2024-09-20 ENCOUNTER — PATIENT ROUNDING (BHMG ONLY) (OUTPATIENT)
Dept: OBSTETRICS AND GYNECOLOGY | Facility: CLINIC | Age: 34
End: 2024-09-20
Payer: COMMERCIAL

## 2024-09-28 LAB — INFORMED CONSENT NEEDED: NORMAL

## 2024-10-14 ENCOUNTER — ROUTINE PRENATAL (OUTPATIENT)
Dept: OBSTETRICS AND GYNECOLOGY | Facility: CLINIC | Age: 34
End: 2024-10-14
Payer: COMMERCIAL

## 2024-10-14 VITALS — WEIGHT: 132.6 LBS | SYSTOLIC BLOOD PRESSURE: 116 MMHG | DIASTOLIC BLOOD PRESSURE: 74 MMHG | BODY MASS INDEX: 24.25 KG/M2

## 2024-10-14 DIAGNOSIS — Z87.59 HISTORY OF GESTATIONAL HYPERTENSION: ICD-10-CM

## 2024-10-14 DIAGNOSIS — R82.5 POSITIVE URINE DRUG SCREEN: ICD-10-CM

## 2024-10-14 DIAGNOSIS — F17.210 CIGARETTE SMOKER: ICD-10-CM

## 2024-10-14 DIAGNOSIS — Z79.899 HIGH RISK MEDICATION USE: ICD-10-CM

## 2024-10-14 DIAGNOSIS — O99.320 PREGNANCY COMPLICATED BY SUBUTEX MAINTENANCE, ANTEPARTUM: ICD-10-CM

## 2024-10-14 DIAGNOSIS — O43.109 PLACENTAL ABNORMALITY, ANTEPARTUM: ICD-10-CM

## 2024-10-14 DIAGNOSIS — Z98.891 S/P CESAREAN SECTION: ICD-10-CM

## 2024-10-14 DIAGNOSIS — Z36.9 ENCOUNTER FOR ANTENATAL SCREENING, UNSPECIFIED: Primary | ICD-10-CM

## 2024-10-14 DIAGNOSIS — O09.522 MULTIGRAVIDA OF ADVANCED MATERNAL AGE IN SECOND TRIMESTER: ICD-10-CM

## 2024-10-14 DIAGNOSIS — F11.20 PREGNANCY COMPLICATED BY SUBUTEX MAINTENANCE, ANTEPARTUM: ICD-10-CM

## 2024-10-14 DIAGNOSIS — G43.109 MIGRAINE WITH AURA AND WITHOUT STATUS MIGRAINOSUS, NOT INTRACTABLE: ICD-10-CM

## 2024-10-14 LAB
BILIRUB BLD-MCNC: NEGATIVE MG/DL
CLARITY, POC: CLEAR
COLOR UR: YELLOW
GLUCOSE UR STRIP-MCNC: NEGATIVE MG/DL
KETONES UR QL: NEGATIVE
LEUKOCYTE EST, POC: NEGATIVE
NITRITE UR-MCNC: NEGATIVE MG/ML
PH UR: 5 [PH] (ref 5–8)
PROT UR STRIP-MCNC: ABNORMAL MG/DL
RBC # UR STRIP: NEGATIVE /UL
SP GR UR: 1 (ref 1–1.03)
UROBILINOGEN UR QL: NORMAL

## 2024-10-14 PROCEDURE — 0502F SUBSEQUENT PRENATAL CARE: CPT | Performed by: OBSTETRICS & GYNECOLOGY

## 2024-10-14 RX ORDER — POLYMYXIN B SULFATE AND TRIMETHOPRIM 1; 10000 MG/ML; [USP'U]/ML
1 SOLUTION OPHTHALMIC EVERY 4 HOURS
COMMUNITY
Start: 2024-09-30

## 2024-10-14 NOTE — PROGRESS NOTES
Chief Complaint   Patient presents with    Routine Prenatal Visit       HPI: 34 y.o.  at 25w0d here for U/S for growth (smoker, placental lakes, subutex maintenance, AMA)    Relevant data reviewed:    Vitals:    10/14/24 1546   BP: 116/74   Weight: 60.1 kg (132 lb 9.6 oz)     Total weight gain for pregnancy:  3.901 kg (8 lb 9.6 oz)    Cx exam:   / /        Review of systems:   Gen:   CV:       GI:   :     MS:      Neuro:   Pul:     Physical Exam  Vitals and nursing note reviewed.   Constitutional:       Appearance: She is well-developed.   HENT:      Head: Normocephalic and atraumatic.   Cardiovascular:      Rate and Rhythm: Normal rate.   Pulmonary:      Effort: Pulmonary effort is normal.   Abdominal:      General: There is no distension.      Palpations: Abdomen is soft. There is no mass.      Tenderness: There is no abdominal tenderness. There is no guarding.   Genitourinary:     Vagina: No vaginal discharge.   Musculoskeletal:         General: No tenderness or deformity. Normal range of motion.      Cervical back: Normal range of motion.   Skin:     General: Skin is warm and dry.      Coloration: Skin is not pale.      Findings: No erythema or rash.   Neurological:      Mental Status: She is alert and oriented to person, place, and time.   Psychiatric:         Behavior: Behavior normal.         Thought Content: Thought content normal.         Judgment: Judgment normal.             Results for orders placed in visit on 10/14/24    US Ob Follow Up Transabdominal Approach    Narrative  IMP: EFW =40%, MARIA EUGENIA = 17.7, DVP = 5.11  Ceph, ant plac w/ placental lakes       A/P  1. Intrauterine pregnancy at 25w0d   2. Pregnancy Risk:  HIGH RISK    Diagnoses and all orders for this visit:    1. Encounter for  screening, unspecified (Primary)  -     POC Urinalysis Dipstick    2. Cigarette smoker  Overview:  Smokes 1/2 ppd.  Aware she needs to quit.  Given RX for Nicoderm but hasn't started yet.       3.  Migraine with aura and without status migrainosus, not intractable    4. Positive urine drug screen- + Buprenorphine    5. History of gestational hypertension  Overview:  Taking baby asa daily  Baseline CMP WNL; P/C mepys=623      6. Multigravida of advanced maternal age in second trimester  Overview:  NIPT low risk, AFP neg       7. S/P  section  Overview:  for FTP, plan RLTCS at 39 weeks       8. Pregnancy complicated by subutex maintenance, antepartum  Overview:  pt on Subutex 8 mg TID, tries to take BID. Also on Neurontin 800 mg BID to TID. Dr John Puckett prescribing. Missed MFM appt last week; rescheduled to 24. Will need growth US q 4 weeks and ANT       9. High risk medication use    10. Placental abnormality, antepartum  Overview:  placental lakes.  serial scans for growth          Nutrition and weight gain were addressed.  Practice OB call structure was discussed.   Discussed Covid vaccination in pregnancy including CDC guidelines.  Vaccination strongly encouraged with risk of potential severe illness in unvaccinated.    -----------------------  PLAN:   Return in about 3 weeks (around 2024) for ob tummy, GTT/HH, Tdap.    Summary of Plan  - Serial growth ultrasounds for weeks with primary OB    - Weekly fetal  surveillance starting at 32 weeks until delivery   -Starting at 28 weeks: Fetal movement instructions given continue daily until delivery; instructed to report to labor and delivery if cannot achieve more than 10 kicks in one hour or if she perceives a decrease in fetal movement  -Continue routine prenatal care with primary ob team   -Recommend delivery at 39 weeks    Imtiaz Campos MD  10/14/2024 16:09 EDT

## 2024-11-05 ENCOUNTER — ROUTINE PRENATAL (OUTPATIENT)
Dept: OBSTETRICS AND GYNECOLOGY | Facility: CLINIC | Age: 34
End: 2024-11-05
Payer: COMMERCIAL

## 2024-11-05 VITALS — SYSTOLIC BLOOD PRESSURE: 124 MMHG | WEIGHT: 135.6 LBS | BODY MASS INDEX: 24.8 KG/M2 | DIASTOLIC BLOOD PRESSURE: 78 MMHG

## 2024-11-05 DIAGNOSIS — Z23 NEED FOR TDAP VACCINATION: ICD-10-CM

## 2024-11-05 DIAGNOSIS — O43.109 PLACENTAL ABNORMALITY, ANTEPARTUM: ICD-10-CM

## 2024-11-05 DIAGNOSIS — F17.210 CIGARETTE SMOKER: ICD-10-CM

## 2024-11-05 DIAGNOSIS — Z36.9 ENCOUNTER FOR ANTENATAL SCREENING, UNSPECIFIED: Primary | ICD-10-CM

## 2024-11-05 DIAGNOSIS — Z87.59 HISTORY OF GESTATIONAL HYPERTENSION: ICD-10-CM

## 2024-11-05 DIAGNOSIS — F11.20 PREGNANCY COMPLICATED BY SUBUTEX MAINTENANCE, ANTEPARTUM: ICD-10-CM

## 2024-11-05 DIAGNOSIS — Z98.891 S/P CESAREAN SECTION: ICD-10-CM

## 2024-11-05 DIAGNOSIS — O99.320 PREGNANCY COMPLICATED BY SUBUTEX MAINTENANCE, ANTEPARTUM: ICD-10-CM

## 2024-11-05 PROBLEM — O09.523 MULTIGRAVIDA OF ADVANCED MATERNAL AGE IN THIRD TRIMESTER: Status: ACTIVE | Noted: 2024-06-25

## 2024-11-05 RX ORDER — CYCLOBENZAPRINE HCL 10 MG
TABLET ORAL
COMMUNITY
Start: 2024-10-29

## 2024-11-06 LAB
GLUCOSE 1H P 75 G GLC PO SERPL-MCNC: 152 MG/DL (ref 70–179)
GLUCOSE 2H P 75 G GLC PO SERPL-MCNC: 109 MG/DL (ref 70–152)
GLUCOSE P FAST SERPL-MCNC: 68 MG/DL (ref 70–91)
HCT VFR BLD AUTO: 38.9 % (ref 34–46.6)
HGB BLD-MCNC: 12.7 G/DL (ref 11.1–15.9)
RPR SER QL: NON REACTIVE

## 2024-11-19 ENCOUNTER — ROUTINE PRENATAL (OUTPATIENT)
Dept: OBSTETRICS AND GYNECOLOGY | Facility: CLINIC | Age: 34
End: 2024-11-19
Payer: COMMERCIAL

## 2024-11-19 VITALS — WEIGHT: 140.6 LBS | BODY MASS INDEX: 25.72 KG/M2 | DIASTOLIC BLOOD PRESSURE: 78 MMHG | SYSTOLIC BLOOD PRESSURE: 128 MMHG

## 2024-11-19 DIAGNOSIS — F11.20 PREGNANCY COMPLICATED BY SUBUTEX MAINTENANCE, ANTEPARTUM: ICD-10-CM

## 2024-11-19 DIAGNOSIS — O09.523 MULTIGRAVIDA OF ADVANCED MATERNAL AGE IN THIRD TRIMESTER: ICD-10-CM

## 2024-11-19 DIAGNOSIS — Z3A.30 30 WEEKS GESTATION OF PREGNANCY: Primary | ICD-10-CM

## 2024-11-19 DIAGNOSIS — Z98.891 S/P CESAREAN SECTION: ICD-10-CM

## 2024-11-19 DIAGNOSIS — O99.320 PREGNANCY COMPLICATED BY SUBUTEX MAINTENANCE, ANTEPARTUM: ICD-10-CM

## 2024-11-19 DIAGNOSIS — Z36.9 ENCOUNTER FOR ANTENATAL SCREENING, UNSPECIFIED: ICD-10-CM

## 2024-11-19 DIAGNOSIS — Z87.59 HISTORY OF GESTATIONAL HYPERTENSION: ICD-10-CM

## 2024-11-19 DIAGNOSIS — O43.109 PLACENTAL ABNORMALITY, ANTEPARTUM: ICD-10-CM

## 2024-11-19 NOTE — PROGRESS NOTES
Chief Complaint   Patient presents with    Routine Prenatal Visit       HPI: 34 y.o.  at 30w1d presenting for an OB visit. Overall feeling well today.  She denies any fevers, chills, headaches, blurry vision, chest pain, shortness of breath abdominal pain, dysuria, or leg swelling.  She denies any vaginal bleeding, leakage of fluid, contractions, change in fetal movement, or increased vaginal pressure.    Relevant data reviewed:    Last OB US Data (since 5/3/2024)       None          Vitals:    24 1048   BP: 128/78   Weight: 63.8 kg (140 lb 9.6 oz)     Total weight gain for pregnancy:  7.53 kg (16 lb 9.6 oz)    Cx exam:   / /        Review of systems:   Gen: negative  CV:     negative  GI: negative  :   negative  MS:    negative  Neuro: negative  Pul: negative    Physical Exam  Constitutional:       General: She is not in acute distress.     Appearance: She is not ill-appearing.   Eyes:      Pupils: Pupils are equal, round, and reactive to light.   Pulmonary:      Effort: Pulmonary effort is normal. No respiratory distress.   Abdominal:      General: There is no distension.      Palpations: Abdomen is soft.      Tenderness: There is no abdominal tenderness.   Musculoskeletal:         General: Normal range of motion.   Neurological:      General: No focal deficit present.      Mental Status: She is alert and oriented to person, place, and time.   Psychiatric:         Mood and Affect: Mood normal.         Behavior: Behavior normal.         A/P  1. Intrauterine pregnancy at 30w1d   2. Pregnancy Risk:  NORMAL    Diagnoses and all orders for this visit:    1. 30 weeks gestation of pregnancy (Primary)    2. Encounter for  screening, unspecified  -     POC Urinalysis Dipstick, Multipro    3. S/P  section  Overview:  for FTP, plan RLTCS at 39 weeks       4. Multigravida of advanced maternal age in third trimester  Overview:  NIPT low risk, AFP neg       5. History of gestational  hypertension  Overview:  Taking baby asa daily  Baseline CMP WNL; P/C uaohk=119      6. Placental abnormality, antepartum  Overview:  placental lakes.  serial scans for growth      7. Pregnancy complicated by subutex maintenance, antepartum  Overview:  pt on Subutex 8 mg TID, tries to take BID. Also on Neurontin 800 mg BID to TID. Dr John Puckett prescribing. Missed MFM appt last week; rescheduled to 9/19/24. Will need growth US q 4 weeks and ANT           Routine labor warnings were discussed and indications for L & D f/u including bleeding, regular contractions, decreased fetal movement or/and rupture of membranes.   -----------------------  PLAN:   Return in about 2 weeks (around 12/3/2024) for OB visit, BPP, Growth scan.    Spenser Liao MD  11/19/2024   11:15 EST

## 2024-11-27 PROBLEM — Z79.899 HIGH RISK MEDICATION USE: Status: RESOLVED | Noted: 2024-06-25 | Resolved: 2024-11-27

## 2024-11-28 NOTE — PROGRESS NOTES
Chief Complaint   Patient presents with    Routine Prenatal Visit       HPI: 34 y.o.  at 31w2d here for ob check and ANT for subutex use.    Relevant data reviewed:    Vitals:    24 1224   BP: 118/72   Weight: 64.3 kg (141 lb 12.8 oz)     Total weight gain for pregnancy:  8.074 kg (17 lb 12.8 oz)    Cx exam:   / /        Review of systems:   Gen: negative  CV:     negative  GI: negative  :   alissa araiza type contractions  MS:    negative  Neuro: negative  Pul: negative    Physical Exam  Vitals and nursing note reviewed.   Constitutional:       Appearance: She is well-developed.   HENT:      Head: Normocephalic and atraumatic.   Cardiovascular:      Rate and Rhythm: Normal rate.   Pulmonary:      Effort: Pulmonary effort is normal.   Abdominal:      General: There is no distension.      Palpations: Abdomen is soft. There is no mass.      Tenderness: There is no abdominal tenderness. There is no guarding.   Genitourinary:     Vagina: No vaginal discharge.   Musculoskeletal:         General: No tenderness or deformity. Normal range of motion.      Cervical back: Normal range of motion.   Skin:     General: Skin is warm and dry.      Coloration: Skin is not pale.      Findings: No erythema or rash.   Neurological:      Mental Status: She is alert and oriented to person, place, and time.   Psychiatric:         Behavior: Behavior normal.         Thought Content: Thought content normal.         Judgment: Judgment normal.             Results for orders placed in visit on 22    US Fetal Biophysical Profile;With Non-Stress Testing    IMP:  BPP for growth, AMA, subutex use, placental lakes.   8/8, EFW = 42.6cm, MARIA EUGENIA = 19, DVP 7.18m, lakes still present     A/P  1. Intrauterine pregnancy at 31w2d   2. Pregnancy Risk:  HIGH RISK    Diagnoses and all orders for this visit:    1. Pregnancy complicated by subutex maintenance, antepartum (Primary)  Overview:  pt on Subutex 8 mg TID, tries to take BID. Also on  Neurontin 800 mg BID to TID. Dr John Puckett prescribing. Missed MFM appt last week; rescheduled to 24. Will need growth US q 4 weeks and ANT       2. S/P  section  Overview:  for FTP, plan RLTCS at 39 weeks       3. Multigravida of advanced maternal age in third trimester  Overview:  NIPT low risk, AFP neg       4. History of gestational hypertension  Overview:  Taking baby asa daily  Baseline CMP WNL; P/C nqior=827      5. Placental abnormality, antepartum  Overview:  placental lakes.  serial scans for growth      6. Positive urine drug screen- + Buprenorphine    7. Migraine with aura and without status migrainosus, not intractable    8. Cigarette smoker  Overview:  Smokes 1/2 ppd.  Aware she needs to quit.  Given RX for Nicoderm but hasn't started yet.       9. Encounter for  screening, unspecified  -     POC Urinalysis Dipstick, Multipro    10. Need for vaccination  Overview:  COVID: UP TO DATE  FLU: declined  TDAP 24:  RSV: declined           labor was discussed.  Warnings were provided.  Practice OB call structure was discussed.   -----------------------  PLAN:   Return in about 1 year (around 2025) for ob angelia.      Imtiaz Campos MD  2024 12:51 EST

## 2024-12-02 ENCOUNTER — ROUTINE PRENATAL (OUTPATIENT)
Dept: OBSTETRICS AND GYNECOLOGY | Facility: CLINIC | Age: 34
End: 2024-12-02
Payer: COMMERCIAL

## 2024-12-02 VITALS — SYSTOLIC BLOOD PRESSURE: 118 MMHG | WEIGHT: 141.8 LBS | BODY MASS INDEX: 25.94 KG/M2 | DIASTOLIC BLOOD PRESSURE: 72 MMHG

## 2024-12-02 DIAGNOSIS — O09.523 MULTIGRAVIDA OF ADVANCED MATERNAL AGE IN THIRD TRIMESTER: ICD-10-CM

## 2024-12-02 DIAGNOSIS — O99.320 PREGNANCY COMPLICATED BY SUBUTEX MAINTENANCE, ANTEPARTUM: Primary | ICD-10-CM

## 2024-12-02 DIAGNOSIS — F11.20 PREGNANCY COMPLICATED BY SUBUTEX MAINTENANCE, ANTEPARTUM: Primary | ICD-10-CM

## 2024-12-02 DIAGNOSIS — F17.210 CIGARETTE SMOKER: ICD-10-CM

## 2024-12-02 DIAGNOSIS — Z23 NEED FOR VACCINATION: ICD-10-CM

## 2024-12-02 DIAGNOSIS — Z98.891 S/P CESAREAN SECTION: ICD-10-CM

## 2024-12-02 DIAGNOSIS — O43.109 PLACENTAL ABNORMALITY, ANTEPARTUM: ICD-10-CM

## 2024-12-02 DIAGNOSIS — Z87.59 HISTORY OF GESTATIONAL HYPERTENSION: ICD-10-CM

## 2024-12-02 DIAGNOSIS — G43.109 MIGRAINE WITH AURA AND WITHOUT STATUS MIGRAINOSUS, NOT INTRACTABLE: ICD-10-CM

## 2024-12-02 DIAGNOSIS — R82.5 POSITIVE URINE DRUG SCREEN: ICD-10-CM

## 2024-12-02 DIAGNOSIS — Z36.9 ENCOUNTER FOR ANTENATAL SCREENING, UNSPECIFIED: ICD-10-CM

## 2024-12-02 PROCEDURE — 0502F SUBSEQUENT PRENATAL CARE: CPT | Performed by: OBSTETRICS & GYNECOLOGY

## 2024-12-12 ENCOUNTER — ROUTINE PRENATAL (OUTPATIENT)
Dept: OBSTETRICS AND GYNECOLOGY | Facility: CLINIC | Age: 34
End: 2024-12-12
Payer: COMMERCIAL

## 2024-12-12 VITALS — SYSTOLIC BLOOD PRESSURE: 118 MMHG | WEIGHT: 141.6 LBS | BODY MASS INDEX: 25.9 KG/M2 | DIASTOLIC BLOOD PRESSURE: 62 MMHG

## 2024-12-12 DIAGNOSIS — Z36.9 ENCOUNTER FOR ANTENATAL SCREENING, UNSPECIFIED: Primary | ICD-10-CM

## 2024-12-12 DIAGNOSIS — Z23 NEED FOR VACCINATION: ICD-10-CM

## 2024-12-12 DIAGNOSIS — F17.210 CIGARETTE SMOKER: ICD-10-CM

## 2024-12-12 DIAGNOSIS — Z87.59 HISTORY OF GESTATIONAL HYPERTENSION: ICD-10-CM

## 2024-12-12 DIAGNOSIS — O09.523 MULTIGRAVIDA OF ADVANCED MATERNAL AGE IN THIRD TRIMESTER: ICD-10-CM

## 2024-12-12 DIAGNOSIS — O99.320 PREGNANCY COMPLICATED BY SUBUTEX MAINTENANCE, ANTEPARTUM: ICD-10-CM

## 2024-12-12 DIAGNOSIS — Z98.891 S/P CESAREAN SECTION: ICD-10-CM

## 2024-12-12 DIAGNOSIS — R82.5 POSITIVE URINE DRUG SCREEN: ICD-10-CM

## 2024-12-12 DIAGNOSIS — F11.20 PREGNANCY COMPLICATED BY SUBUTEX MAINTENANCE, ANTEPARTUM: ICD-10-CM

## 2024-12-12 NOTE — PROGRESS NOTES
Chief Complaint   Patient presents with    Routine Prenatal Visit     US - TODAY        HPI: 34 y.o.  at 33+3 . She feels okay; taking her ASA, Nicoderm patches. Declines RSV and flu vaccine. Has seen MFM . She is on ASA 81 mg daily. Her migraines are fine. She is on Subutex and Neurontin and trying to use them less frequently.    Had her BPP today + FM       Relevant data reviewed:    Last OB US Data (since 2024)       None          Vitals:    24 0857   BP: 118/62   Weight: 64.2 kg (141 lb 9.6 oz)     Total weight gain for pregnancy:  7.983 kg (17 lb 9.6 oz)    Vitals: VSS; AF    General Appearance:  Awake. Alert. Well developed. Well nourished. In no acute distress.    Visual Inspection: ° Abdomen was normal on visual inspection.  Palpation: ° Abdomen was soft. ° Abdominal non-tender.    Uterus: ° Fundal height was normal for gestational age. ° Not tender.  Uterine Adnexae: ° Normal without masses or tenderness.  Neurological:  ° Oriented to time, place, and person.  Skin:  ° General appearance was normal. No bruising or ecchymosis.  Obstetrical: +FM and FCA     Presentation: VTX  Placenta: Anterior  MARIA EUGENIA: 14 cm   FCA: 150's     BPP         Ultrasound images and report reviewed personally by me.          Matheus Duarte, DO             A/P  1. 35 yo  @ 33+    2. Pregnancy Risk:  HIGH RISK    3. AMA- NIPT low risk as is  AFP    4. C/S x 1- for FTP, plan RLTCS at 39 weeks  Schedule RLTCS next week ( )     5. H/O drug use- pt on Subutex 8 mg TID, tries to take BID. Also on Neurontin 800 mg BID to TID. Dr John Puckett prescribing. She has an appt with MFM at 20 weeks.    Summary of Plan  - Serial growth ultrasounds four weeks with primary OB    - Weekly fetal  surveillance starting at 32 weeks until delivery   -Starting at 28 weeks: Fetal movement instructions   -Continue routine prenatal care with primary ob team   -Recommend delivery at 39 weeks    BPP weekly ; today     Growth 32 EFW 1165 g (50th percentile)   , 36 wga     6. CF/SMA/FX neg    7. H/O GHTN- on ASA 81 mg.   Check CMP reassuring , Pr:Cr 150    8. Migraines- not bad    9. Tdap received   Declines flu and RSV vaccine     10. RTO 4 weeks OBT and US anatomy ( if not done by M)   Diagnoses and all orders for this visit:    1. Encounter for  screening, unspecified (Primary)  -     POC Urinalysis Dipstick        F/u 1 week BPP, Growth 36wga     Matheus Duarte DO  2024 08:59 EST

## 2024-12-17 ENCOUNTER — TELEPHONE (OUTPATIENT)
Dept: OBSTETRICS AND GYNECOLOGY | Facility: CLINIC | Age: 34
End: 2024-12-17

## 2024-12-17 NOTE — TELEPHONE ENCOUNTER
Provider: DR PALM    Caller: Rema Cortés    Relationship to Patient: Self    Phone Number: [phone] - NO VM    Reason for Call: OB PT CALLING TO SEE IF DR PALM GOT AN ANSWER ABOUT THE PT BEING ABLE TO DELIVER @ [facility]     PLEASE CALL THE PT OR SEND HER A [employer] MESSAGE TO LET HER KNOW    THANK YOU!

## 2024-12-18 NOTE — TELEPHONE ENCOUNTER
From Dr. Truong - Honestly, if her biggest concern is about not being  from the baby after delivery, her best option would be to transfer to one of the [place] or [facility] practices. Our NICU is so frequently on diversion we cannot guarantee the baby would not need to be transferred out after delivery if  abstinence syndrome is an issue.

## 2024-12-19 NOTE — TELEPHONE ENCOUNTER
Do we have a [facility] physician that can accept; She is a great patient just the NICU is her concern

## 2024-12-26 ENCOUNTER — TELEPHONE (OUTPATIENT)
Dept: OBSTETRICS AND GYNECOLOGY | Facility: CLINIC | Age: 34
End: 2024-12-26

## 2024-12-26 NOTE — TELEPHONE ENCOUNTER
Caller: Rema Cortés    Relationship: Self    Best call back number: [phone]    What is the best time to reach you: ANY    Who are you requesting to speak with (clinical staff, provider,  specific staff member): CLINICAL       What was the call regarding: PT STATES DR PALM ADVISED SHE TRANSFER TO [facility] DUE TO BABY NEEDING NICU STAY AFTER BIRTH; PT IS WANTING DR PALM RECOMMENDATION ON WHICH DOCTOR TO TRANSFER TO

## 2024-12-30 ENCOUNTER — ROUTINE PRENATAL (OUTPATIENT)
Dept: OBSTETRICS AND GYNECOLOGY | Facility: CLINIC | Age: 34
End: 2024-12-30
Payer: COMMERCIAL

## 2024-12-30 VITALS — BODY MASS INDEX: 26.23 KG/M2 | WEIGHT: 143.4 LBS | SYSTOLIC BLOOD PRESSURE: 132 MMHG | DIASTOLIC BLOOD PRESSURE: 82 MMHG

## 2024-12-30 DIAGNOSIS — O99.320 PREGNANCY COMPLICATED BY SUBUTEX MAINTENANCE, ANTEPARTUM: ICD-10-CM

## 2024-12-30 DIAGNOSIS — O43.109 PLACENTAL ABNORMALITY, ANTEPARTUM: ICD-10-CM

## 2024-12-30 DIAGNOSIS — Z23 NEED FOR VACCINATION: ICD-10-CM

## 2024-12-30 DIAGNOSIS — F11.20 PREGNANCY COMPLICATED BY SUBUTEX MAINTENANCE, ANTEPARTUM: ICD-10-CM

## 2024-12-30 DIAGNOSIS — O09.523 MULTIGRAVIDA OF ADVANCED MATERNAL AGE IN THIRD TRIMESTER: ICD-10-CM

## 2024-12-30 DIAGNOSIS — Z36.9 ENCOUNTER FOR ANTENATAL SCREENING, UNSPECIFIED: Primary | ICD-10-CM

## 2024-12-30 DIAGNOSIS — G43.109 MIGRAINE WITH AURA AND WITHOUT STATUS MIGRAINOSUS, NOT INTRACTABLE: ICD-10-CM

## 2024-12-30 DIAGNOSIS — Z36.85 SCREENING, ANTENATAL, FOR STREPTOCOCCUS B: ICD-10-CM

## 2024-12-30 DIAGNOSIS — F17.210 CIGARETTE SMOKER: ICD-10-CM

## 2024-12-30 DIAGNOSIS — Z98.891 S/P CESAREAN SECTION: ICD-10-CM

## 2024-12-30 DIAGNOSIS — Z87.59 HISTORY OF GESTATIONAL HYPERTENSION: ICD-10-CM

## 2024-12-30 PROCEDURE — 0502F SUBSEQUENT PRENATAL CARE: CPT | Performed by: OBSTETRICS & GYNECOLOGY

## 2024-12-30 NOTE — PROGRESS NOTES
Chief Complaint   Patient presents with    Routine Prenatal Visit       HPI: 34 y.o.  at 36w0d here for BPP for AMA, high risk medication use.     Relevant data reviewed:    Vitals:    24 1354   BP: 132/82   Weight: 65 kg (143 lb 6.4 oz)     Total weight gain for pregnancy:  8.8 kg (19 lb 6.4 oz)    Cx exam:   / /   Presentation: Vertex    Review of systems:   Gen: negative  CV:     negative  GI: negative  :   negative  MS:    negative  Neuro: negative  Pul: negative    Physical Exam  Vitals and nursing note reviewed.   Constitutional:       Appearance: She is well-developed.   HENT:      Head: Normocephalic and atraumatic.   Cardiovascular:      Rate and Rhythm: Normal rate.   Pulmonary:      Effort: Pulmonary effort is normal.   Abdominal:      General: There is no distension.      Palpations: Abdomen is soft. There is no mass.      Tenderness: There is no abdominal tenderness. There is no guarding.   Genitourinary:     Vagina: No vaginal discharge.   Musculoskeletal:         General: No tenderness or deformity. Normal range of motion.      Cervical back: Normal range of motion.   Skin:     General: Skin is warm and dry.      Coloration: Skin is not pale.      Findings: No erythema or rash.   Neurological:      Mental Status: She is alert and oriented to person, place, and time.   Psychiatric:         Behavior: Behavior normal.         Thought Content: Thought content normal.         Judgment: Judgment normal.       US Fetal Biophysical Profile;With Non-Stress Testing   (breathing) + NST = 8/10       A/P  1. Intrauterine pregnancy at 36w0d   2. Pregnancy Risk:  HIGH RISK    Diagnoses and all orders for this visit:    1. Encounter for  screening, unspecified (Primary)  -     POC Urinalysis Dipstick, Multipro    2. Need for vaccination  Overview:  COVID: UP TO DATE  FLU: declined  TDAP 24:  RSV: declined      3. Cigarette smoker  Overview:  Smokes 1/2 ppd.  Aware she needs to quit.   Given RX for Nicoderm but hasn't started yet.       4. Migraine with aura and without status migrainosus, not intractable    5. Pregnancy complicated by subutex maintenance, antepartum  Overview:  pt on Subutex 8 mg TID, tries to take BID. Also on Neurontin 800 mg BID to TID. Dr John Puckett prescribing. Missed MFM appt last week; rescheduled to 24. Will need growth US q 4 weeks and ANT       6. S/P  section  Overview:  for FTP, plan RLTCS at 39 weeks       7. Multigravida of advanced maternal age in third trimester  Overview:  NIPT low risk, AFP neg       8. History of gestational hypertension  Overview:  Taking baby asa daily  Baseline CMP WNL; P/C lggfo=785      9. Placental abnormality, antepartum  Overview:  placental lakes.  serial scans for growth      10. Screening, , for Streptococcus B  -     Group B Streptococcus Culture - Swab, Vaginal/Rectum    Summary of Plan  - Serial growth ultrasounds four weeks with primary OB    - Weekly fetal  surveillance starting at 32 weeks until delivery   -Starting at 28 weeks: Fetal movement instructions   -Continue routine prenatal care with primary ob team   -Recommend delivery at 39 weeks    Routine labor warnings were discussed and indications for L & D f/u including bleeding, regular contractions, decreased fetal movement or/and rupture of membranes.   Practice OB call structure was discussed.   -----------------------  PLAN:   Return in about 4 days (around 1/3/2025) for Recheck. Rpt BPP for AMA, medication use, previous BPP of 8/10 (movement)    I spent 30+ minutes caring for Rema on this date of service. This time includes time spent by me in the following activities: preparing for the visit, reviewing tests, performing a medically appropriate examination and/or evaluation, counseling and educating the patient/family/caregiver, referring and communicating with other health care professionals, documenting information in the medical  record, independently interpreting results and communicating that information with the patient/family/caregiver, care coordination, ordering medications, ordering test(s), ordering procedure(s), obtaining a separately obtained history, and reviewing a separately obtained historyThis time is not included in the time used to interpret the ultrasound also reported today.    Imtiaz Campos MD  12/30/2024 15:21 EST

## 2025-01-02 ENCOUNTER — ROUTINE PRENATAL (OUTPATIENT)
Dept: OBSTETRICS AND GYNECOLOGY | Facility: CLINIC | Age: 35
End: 2025-01-02
Payer: COMMERCIAL

## 2025-01-02 VITALS — SYSTOLIC BLOOD PRESSURE: 136 MMHG | DIASTOLIC BLOOD PRESSURE: 88 MMHG | BODY MASS INDEX: 26.78 KG/M2 | WEIGHT: 146.4 LBS

## 2025-01-02 DIAGNOSIS — Z98.891 S/P CESAREAN SECTION: ICD-10-CM

## 2025-01-02 DIAGNOSIS — Z87.59 HISTORY OF GESTATIONAL HYPERTENSION: ICD-10-CM

## 2025-01-02 DIAGNOSIS — O09.523 MULTIGRAVIDA OF ADVANCED MATERNAL AGE IN THIRD TRIMESTER: ICD-10-CM

## 2025-01-02 DIAGNOSIS — Z36.9 ENCOUNTER FOR ANTENATAL SCREENING, UNSPECIFIED: Primary | ICD-10-CM

## 2025-01-02 NOTE — PROGRESS NOTES
OB follow up     Rema Cortés is a 34 y.o.  36w3d being seen today for her obstetrical visit.  Patient reports no bleeding, no contractions, and no leaking. Fetal movement: normal.  Prenatal care complicated by advanced maternal age and history of previous .  Had a BPP earlier this week of 8 out of 10 and presents today for repeat biophysical profile.  Reports active fetal movement, no vaginal bleeding and no loss of fluid.  Plans repeat low-transverse  section at 39 weeks.    Review of Systems  No bleeding, No cramping/contractions     /88   Wt 66.4 kg (146 lb 6.4 oz)   LMP 2024 (Exact Date)   BMI 26.78 kg/m²     FHT: present BPM   Uterine Size:     BPP is 8 out of 8, MARIA EUGENIA normal.  Vertex position.    GBS collected last visit still pending.    Assessment/Plan:    1) 34 y.o.  -pregnancy at 36w3d    2)   Encounter Diagnoses   Name Primary?    Encounter for  screening, unspecified Yes    Multigravida of advanced maternal age in third trimester     S/P  section     History of gestational hypertension    Reassuring  testing.  Continue kick counts.  Plan repeat low-transverse  section at 39 weeks.    3) Reviewed this stage of pregnancy  4) Problem list updated     Return in about 1 week (around 2025) for BPP, OB INT.    I spent 20 minutes caring for Rema on this date of service. This time includes time spent by me in the following activities: preparing for the visit, reviewing tests, performing a medically appropriate examination and/or evaluation, and documenting information in the medical record.      Kamran Kamara MD    2025  13:51 EST

## 2025-01-03 ENCOUNTER — PREP FOR SURGERY (OUTPATIENT)
Dept: OTHER | Facility: HOSPITAL | Age: 35
End: 2025-01-03
Payer: COMMERCIAL

## 2025-01-03 ENCOUNTER — TELEPHONE (OUTPATIENT)
Dept: OBSTETRICS AND GYNECOLOGY | Facility: CLINIC | Age: 35
End: 2025-01-03
Payer: COMMERCIAL

## 2025-01-03 DIAGNOSIS — Z98.891 S/P CESAREAN SECTION: Primary | ICD-10-CM

## 2025-01-03 LAB — B-HEM STREP SPEC QL CULT: NEGATIVE

## 2025-01-03 RX ORDER — METHYLERGONOVINE MALEATE 0.2 MG/ML
200 INJECTION INTRAVENOUS ONCE AS NEEDED
OUTPATIENT
Start: 2025-01-03

## 2025-01-03 RX ORDER — SODIUM CHLORIDE, SODIUM LACTATE, POTASSIUM CHLORIDE, CALCIUM CHLORIDE 600; 310; 30; 20 MG/100ML; MG/100ML; MG/100ML; MG/100ML
125 INJECTION, SOLUTION INTRAVENOUS CONTINUOUS
OUTPATIENT
Start: 2025-01-03 | End: 2025-01-04

## 2025-01-03 RX ORDER — SODIUM CHLORIDE 9 MG/ML
40 INJECTION, SOLUTION INTRAVENOUS AS NEEDED
OUTPATIENT
Start: 2025-01-03

## 2025-01-03 RX ORDER — MISOPROSTOL 200 UG/1
800 TABLET ORAL ONCE AS NEEDED
OUTPATIENT
Start: 2025-01-03

## 2025-01-03 RX ORDER — SODIUM CHLORIDE 0.9 % (FLUSH) 0.9 %
10 SYRINGE (ML) INJECTION EVERY 12 HOURS SCHEDULED
OUTPATIENT
Start: 2025-01-03

## 2025-01-03 RX ORDER — CARBOPROST TROMETHAMINE 250 UG/ML
250 INJECTION, SOLUTION INTRAMUSCULAR
OUTPATIENT
Start: 2025-01-03

## 2025-01-03 RX ORDER — ACETAMINOPHEN 500 MG
1000 TABLET ORAL ONCE
OUTPATIENT
Start: 2025-01-03 | End: 2025-01-03

## 2025-01-03 RX ORDER — OXYTOCIN/0.9 % SODIUM CHLORIDE 30/500 ML
999 PLASTIC BAG, INJECTION (ML) INTRAVENOUS ONCE
OUTPATIENT
Start: 2025-01-03 | End: 2025-01-03

## 2025-01-03 RX ORDER — SODIUM CHLORIDE 0.9 % (FLUSH) 0.9 %
10 SYRINGE (ML) INJECTION AS NEEDED
OUTPATIENT
Start: 2025-01-03

## 2025-01-03 RX ORDER — LIDOCAINE HYDROCHLORIDE 10 MG/ML
0.5 INJECTION, SOLUTION EPIDURAL; INFILTRATION; INTRACAUDAL; PERINEURAL ONCE AS NEEDED
OUTPATIENT
Start: 2025-01-03

## 2025-01-03 RX ORDER — KETOROLAC TROMETHAMINE 30 MG/ML
30 INJECTION, SOLUTION INTRAMUSCULAR; INTRAVENOUS ONCE
OUTPATIENT
Start: 2025-01-03 | End: 2025-01-03

## 2025-01-03 RX ORDER — OXYTOCIN/0.9 % SODIUM CHLORIDE 30/500 ML
250 PLASTIC BAG, INJECTION (ML) INTRAVENOUS CONTINUOUS
OUTPATIENT
Start: 2025-01-03 | End: 2025-01-03

## 2025-01-09 ENCOUNTER — ROUTINE PRENATAL (OUTPATIENT)
Dept: OBSTETRICS AND GYNECOLOGY | Facility: CLINIC | Age: 35
End: 2025-01-09
Payer: COMMERCIAL

## 2025-01-09 VITALS — BODY MASS INDEX: 27 KG/M2 | WEIGHT: 147.6 LBS | DIASTOLIC BLOOD PRESSURE: 92 MMHG | SYSTOLIC BLOOD PRESSURE: 142 MMHG

## 2025-01-09 DIAGNOSIS — Z98.891 S/P CESAREAN SECTION: ICD-10-CM

## 2025-01-09 DIAGNOSIS — O16.3 ELEVATED BLOOD PRESSURE AFFECTING PREGNANCY IN THIRD TRIMESTER, ANTEPARTUM: ICD-10-CM

## 2025-01-09 DIAGNOSIS — F17.210 CIGARETTE SMOKER: ICD-10-CM

## 2025-01-09 DIAGNOSIS — O09.529 AMA (ADVANCED MATERNAL AGE) MULTIGRAVIDA 35+, UNSPECIFIED TRIMESTER: Primary | ICD-10-CM

## 2025-01-09 DIAGNOSIS — Z36.9 ENCOUNTER FOR ANTENATAL SCREENING, UNSPECIFIED: ICD-10-CM

## 2025-01-09 NOTE — PROGRESS NOTES
OB follow up     Rema Cortés is a 34 y.o.  37w3d being seen today for her obstetrical visit.  Patient reports no bleeding, no contractions, and no leaking. Fetal movement: normal.  Here for  testing due to advanced maternal age, smoking.  History of a previous .  Plans repeat low-transverse  section at 39 weeks.  Denies headache or visual changes.    Review of Systems  No bleeding, No cramping/contractions     /92   Wt 67 kg (147 lb 9.6 oz)   LMP 2024 (Exact Date)   BMI 27.00 kg/m²     FHT: present BPM   Uterine Size:     BPP is 8 out of 8, MARIA EUGENIA normal at 14.  Vertex position.    Assessment/Plan:    1) 34 y.o.  -pregnancy at 37w3d    2)   Encounter Diagnoses   Name Primary?    AMA (advanced maternal age) multigravida 35+, unspecified trimester Yes    Encounter for  screening, unspecified     Cigarette smoker     S/P  section     Elevated blood pressure affecting pregnancy in third trimester, antepartum    Reassuring  testing.  Repeat blood pressure is 146/94.  Preeclampsia warnings given.  CBC and protein to creatinine ratio ordered.    3) Reviewed this stage of pregnancy  4) Problem list updated     Return in about 1 week (around 2025) for BPP, OB INT.    I spent 20 minutes caring for Rema on this date of service. This time includes time spent by me in the following activities: preparing for the visit, reviewing tests, performing a medically appropriate examination and/or evaluation, counseling and educating the patient/family/caregiver, documenting information in the medical record, independently interpreting results and communicating that information with the patient/family/caregiver, and ordering test(s).      Kamran Kamara MD    2025  15:29 EST

## 2025-01-10 LAB
BASOPHILS # BLD AUTO: 0.1 X10E3/UL (ref 0–0.2)
BASOPHILS NFR BLD AUTO: 0 %
CREAT UR-MCNC: 75.7 MG/DL
EOSINOPHIL # BLD AUTO: 0.1 X10E3/UL (ref 0–0.4)
EOSINOPHIL NFR BLD AUTO: 1 %
ERYTHROCYTE [DISTWIDTH] IN BLOOD BY AUTOMATED COUNT: 12.9 % (ref 11.7–15.4)
HCT VFR BLD AUTO: 38.7 % (ref 34–46.6)
HGB BLD-MCNC: 12.7 G/DL (ref 11.1–15.9)
IMM GRANULOCYTES # BLD AUTO: 0.2 X10E3/UL (ref 0–0.1)
IMM GRANULOCYTES NFR BLD AUTO: 1 %
LYMPHOCYTES # BLD AUTO: 3.5 X10E3/UL (ref 0.7–3.1)
LYMPHOCYTES NFR BLD AUTO: 24 %
MCH RBC QN AUTO: 30.2 PG (ref 26.6–33)
MCHC RBC AUTO-ENTMCNC: 32.8 G/DL (ref 31.5–35.7)
MCV RBC AUTO: 92 FL (ref 79–97)
MONOCYTES # BLD AUTO: 0.8 X10E3/UL (ref 0.1–0.9)
MONOCYTES NFR BLD AUTO: 6 %
NEUTROPHILS # BLD AUTO: 10 X10E3/UL (ref 1.4–7)
NEUTROPHILS NFR BLD AUTO: 68 %
PLATELET # BLD AUTO: 283 X10E3/UL (ref 150–450)
PROT UR-MCNC: 14.3 MG/DL
PROT/CREAT UR: 189 MG/G CREAT (ref 0–200)
RBC # BLD AUTO: 4.21 X10E6/UL (ref 3.77–5.28)
WBC # BLD AUTO: 14.6 X10E3/UL (ref 3.4–10.8)

## 2025-01-16 ENCOUNTER — ROUTINE PRENATAL (OUTPATIENT)
Dept: OBSTETRICS AND GYNECOLOGY | Facility: CLINIC | Age: 35
End: 2025-01-16
Payer: COMMERCIAL

## 2025-01-16 VITALS — DIASTOLIC BLOOD PRESSURE: 70 MMHG | BODY MASS INDEX: 26.7 KG/M2 | WEIGHT: 146 LBS | SYSTOLIC BLOOD PRESSURE: 110 MMHG

## 2025-01-16 DIAGNOSIS — F11.20 PREGNANCY COMPLICATED BY SUBUTEX MAINTENANCE, ANTEPARTUM: ICD-10-CM

## 2025-01-16 DIAGNOSIS — O99.320 PREGNANCY COMPLICATED BY SUBUTEX MAINTENANCE, ANTEPARTUM: ICD-10-CM

## 2025-01-16 DIAGNOSIS — R82.5 POSITIVE URINE DRUG SCREEN: ICD-10-CM

## 2025-01-16 DIAGNOSIS — Z98.891 S/P CESAREAN SECTION: ICD-10-CM

## 2025-01-16 DIAGNOSIS — Z36.9 ENCOUNTER FOR ANTENATAL SCREENING, UNSPECIFIED: ICD-10-CM

## 2025-01-16 DIAGNOSIS — Z3A.38 38 WEEKS GESTATION OF PREGNANCY: Primary | ICD-10-CM

## 2025-01-16 LAB
BILIRUB BLD-MCNC: NEGATIVE MG/DL
CLARITY, POC: CLEAR
COLOR UR: YELLOW
DEPRECATED FRAXE GENE CGG RPT BLD/T QL: NORMAL
GLUCOSE UR STRIP-MCNC: NEGATIVE MG/DL
KETONES UR QL: NEGATIVE
LEUKOCYTE EST, POC: NEGATIVE
NITRITE UR-MCNC: NEGATIVE MG/ML
NTRA CYSTIC FIBROSIS: NORMAL
NTRA SPINAL MUSCULAR ATROPHY: NORMAL
PH UR: 5 [PH] (ref 5–8)
PROT UR STRIP-MCNC: NEGATIVE MG/DL
RBC # UR STRIP: NEGATIVE /UL
SP GR UR: 1 (ref 1–1.03)
UROBILINOGEN UR QL: NORMAL

## 2025-01-16 NOTE — PROGRESS NOTES
Chief Complaint   Patient presents with    Routine Prenatal Visit       HPI: 35 y.o.  at 38w3d presenting for an OB visit. Overall feeling well today  She denies any fevers, chills, headaches, blurry vision, chest pain, shortness of breath abdominal pain, dysuria, or leg swelling.  She denies any vaginal bleeding, leakage of fluid, contractions, change in fetal movement, or increased vaginal pressure.    Relevant data reviewed:    Last OB US Data (since 2024)       None          Vitals:    25 1402   BP: 110/70   Weight: 66.2 kg (146 lb)     Total weight gain for pregnancy:  9.979 kg (22 lb)    Cx exam:   / /        Review of systems:   Gen: negative  CV:     negative  GI: negative  :   negative  MS:    negative  Neuro: negative  Pul: negative    Physical Exam  Constitutional:       General: She is not in acute distress.     Appearance: She is not ill-appearing.   Eyes:      Pupils: Pupils are equal, round, and reactive to light.   Pulmonary:      Effort: Pulmonary effort is normal. No respiratory distress.   Abdominal:      General: There is no distension.      Palpations: Abdomen is soft.      Tenderness: There is no abdominal tenderness.   Musculoskeletal:         General: Normal range of motion.   Neurological:      General: No focal deficit present.      Mental Status: She is alert and oriented to person, place, and time.   Psychiatric:         Mood and Affect: Mood normal.         Behavior: Behavior normal.         A/P  1. Intrauterine pregnancy at 38w3d   - Cephalic presentation, anterior placenta, fetal heart rate 148 bpm, DVP 8.2cm but MARIA EUGENIA 15.9cm, BPP 8/8  - rCS 25  2. Pregnancy Risk:  NORMAL    Diagnoses and all orders for this visit:    1. 38 weeks gestation of pregnancy (Primary)    2. Encounter for  screening, unspecified  -     POC Urinalysis Dipstick    3. S/P  section  Overview:  for FTP, plan RLTCS at 39 weeks       4. Positive urine drug screen- +  Buprenorphine    5. Pregnancy complicated by subutex maintenance, antepartum  Overview:  pt on Subutex 8 mg TID, tries to take BID. Also on Neurontin 800 mg BID to TID. Dr John Puckett prescribing. Missed MFM appt last week; rescheduled to 9/19/24. Will need growth US q 4 weeks and ANT           Routine labor warnings were discussed and indications for L & D f/u including bleeding, regular contractions, decreased fetal movement or/and rupture of membranes.   -----------------------  PLAN:   Return in about 6 days (around 1/22/2025) for rCS.    Spenser Liao MD  1/20/2025   11:59 EST

## 2025-01-21 ENCOUNTER — TELEPHONE (OUTPATIENT)
Dept: OBSTETRICS AND GYNECOLOGY | Facility: HOSPITAL | Age: 35
End: 2025-01-21

## 2025-01-21 NOTE — TELEPHONE ENCOUNTER
Spoke with Rema today @ 0920 via phone. Went over ERAS instructions for tomorrows scheduled c/section with Dr. Duarte @ 0730 am. She is aware she is to take x2 Tylenol 500 mg tablets orally this evening and to drink 20 oz of gatorade (not red) two hours before her given arrival time to the hospital which is 0530 am. Also, went over visitation.-EVANGELINA ORTIZ

## 2025-01-22 ENCOUNTER — ANESTHESIA (OUTPATIENT)
Dept: OBSTETRICS AND GYNECOLOGY | Facility: HOSPITAL | Age: 35
End: 2025-01-22
Payer: COMMERCIAL

## 2025-01-22 ENCOUNTER — HOSPITAL ENCOUNTER (INPATIENT)
Facility: HOSPITAL | Age: 35
LOS: 2 days | Discharge: HOME | End: 2025-01-24
Attending: STUDENT IN AN ORGANIZED HEALTH CARE EDUCATION/TRAINING PROGRAM | Admitting: STUDENT IN AN ORGANIZED HEALTH CARE EDUCATION/TRAINING PROGRAM
Payer: COMMERCIAL

## 2025-01-22 ENCOUNTER — ANESTHESIA EVENT (OUTPATIENT)
Dept: OBSTETRICS AND GYNECOLOGY | Facility: HOSPITAL | Age: 35
End: 2025-01-22
Payer: COMMERCIAL

## 2025-01-22 PROBLEM — Z98.891 STATUS POST REPEAT LOW TRANSVERSE CESAREAN SECTION: Status: ACTIVE | Noted: 2025-01-22

## 2025-01-22 LAB
ABO GROUP BLD: NORMAL
AMPHET+METHAMPHET UR QL: NEGATIVE
AMPHETAMINES UR QL: NEGATIVE
ANION GAP SERPL CALCULATED.3IONS-SCNC: 10.9 MMOL/L (ref 5–15)
BARBITURATES UR QL SCN: NEGATIVE
BENZODIAZ UR QL SCN: NEGATIVE
BLD GP AB SCN SERPL QL: NEGATIVE
BUN SERPL-MCNC: 8 MG/DL (ref 6–20)
BUN/CREAT SERPL: 17 (ref 7–25)
BUPRENORPHINE SERPL-MCNC: POSITIVE NG/ML
CALCIUM SPEC-SCNC: 8.6 MG/DL (ref 8.6–10.5)
CANNABINOIDS SERPL QL: NEGATIVE
CHLORIDE SERPL-SCNC: 106 MMOL/L (ref 98–107)
CO2 SERPL-SCNC: 20.1 MMOL/L (ref 22–29)
COCAINE UR QL: NEGATIVE
CREAT SERPL-MCNC: 0.47 MG/DL (ref 0.57–1)
CREAT UR-MCNC: 43.3 MG/DL
DEPRECATED RDW RBC AUTO: 45.9 FL (ref 37–54)
EGFRCR SERPLBLD CKD-EPI 2021: 125.9 ML/MIN/1.73
ERYTHROCYTE [DISTWIDTH] IN BLOOD BY AUTOMATED COUNT: 13.5 % (ref 12.3–15.4)
GLUCOSE SERPL-MCNC: 78 MG/DL (ref 65–99)
HCT VFR BLD AUTO: 35.8 % (ref 34–46.6)
HGB BLD-MCNC: 12.3 G/DL (ref 12–15.9)
MCH RBC QN AUTO: 31.5 PG (ref 26.6–33)
MCHC RBC AUTO-ENTMCNC: 34.4 G/DL (ref 31.5–35.7)
MCV RBC AUTO: 91.8 FL (ref 79–97)
METHADONE UR QL SCN: NEGATIVE
OPIATES UR QL: NEGATIVE
OXYCODONE UR QL SCN: NEGATIVE
PCP UR QL SCN: NEGATIVE
PLATELET # BLD AUTO: 237 10*3/MM3 (ref 140–450)
PMV BLD AUTO: 9.8 FL (ref 6–12)
POTASSIUM SERPL-SCNC: 3.4 MMOL/L (ref 3.5–5.2)
PROT ?TM UR-MCNC: 11.1 MG/DL
PROT/CREAT UR: 256.4 MG/G CREA (ref 0–200)
RBC # BLD AUTO: 3.9 10*6/MM3 (ref 3.77–5.28)
RH BLD: POSITIVE
SODIUM SERPL-SCNC: 137 MMOL/L (ref 136–145)
T&S EXPIRATION DATE: NORMAL
TREPONEMA PALLIDUM IGG+IGM AB [PRESENCE] IN SERUM OR PLASMA BY IMMUNOASSAY: NORMAL
TRICYCLICS UR QL SCN: NEGATIVE
WBC NRBC COR # BLD AUTO: 12.42 10*3/MM3 (ref 3.4–10.8)

## 2025-01-22 PROCEDURE — 85027 COMPLETE CBC AUTOMATED: CPT | Performed by: STUDENT IN AN ORGANIZED HEALTH CARE EDUCATION/TRAINING PROGRAM

## 2025-01-22 PROCEDURE — S0260 H&P FOR SURGERY: HCPCS | Performed by: STUDENT IN AN ORGANIZED HEALTH CARE EDUCATION/TRAINING PROGRAM

## 2025-01-22 PROCEDURE — 25810000003 LACTATED RINGERS SOLUTION: Performed by: STUDENT IN AN ORGANIZED HEALTH CARE EDUCATION/TRAINING PROGRAM

## 2025-01-22 PROCEDURE — 25010000002 KETOROLAC TROMETHAMINE PER 15 MG: Performed by: STUDENT IN AN ORGANIZED HEALTH CARE EDUCATION/TRAINING PROGRAM

## 2025-01-22 PROCEDURE — 25010000002 CEFAZOLIN PER 500 MG: Performed by: STUDENT IN AN ORGANIZED HEALTH CARE EDUCATION/TRAINING PROGRAM

## 2025-01-22 PROCEDURE — 25010000002 BUPIVACAINE PF 0.75 % SOLUTION: Performed by: NURSE ANESTHETIST, CERTIFIED REGISTERED

## 2025-01-22 PROCEDURE — 25810000003 LACTATED RINGERS PER 1000 ML: Performed by: STUDENT IN AN ORGANIZED HEALTH CARE EDUCATION/TRAINING PROGRAM

## 2025-01-22 PROCEDURE — 84156 ASSAY OF PROTEIN URINE: CPT | Performed by: STUDENT IN AN ORGANIZED HEALTH CARE EDUCATION/TRAINING PROGRAM

## 2025-01-22 PROCEDURE — 25010000002 PHENYLEPHRINE 10 MG/ML SOLUTION: Performed by: NURSE ANESTHETIST, CERTIFIED REGISTERED

## 2025-01-22 PROCEDURE — 86901 BLOOD TYPING SEROLOGIC RH(D): CPT | Performed by: STUDENT IN AN ORGANIZED HEALTH CARE EDUCATION/TRAINING PROGRAM

## 2025-01-22 PROCEDURE — 59514 CESAREAN DELIVERY ONLY: CPT | Performed by: SPECIALIST/TECHNOLOGIST, OTHER

## 2025-01-22 PROCEDURE — 82570 ASSAY OF URINE CREATININE: CPT | Performed by: STUDENT IN AN ORGANIZED HEALTH CARE EDUCATION/TRAINING PROGRAM

## 2025-01-22 PROCEDURE — 25010000002 MORPHINE PER 10 MG: Performed by: NURSE ANESTHETIST, CERTIFIED REGISTERED

## 2025-01-22 PROCEDURE — 25010000002 ONDANSETRON PER 1 MG: Performed by: NURSE ANESTHETIST, CERTIFIED REGISTERED

## 2025-01-22 PROCEDURE — 59510 CESAREAN DELIVERY: CPT | Performed by: STUDENT IN AN ORGANIZED HEALTH CARE EDUCATION/TRAINING PROGRAM

## 2025-01-22 PROCEDURE — 88307 TISSUE EXAM BY PATHOLOGIST: CPT

## 2025-01-22 PROCEDURE — 86780 TREPONEMA PALLIDUM: CPT | Performed by: STUDENT IN AN ORGANIZED HEALTH CARE EDUCATION/TRAINING PROGRAM

## 2025-01-22 PROCEDURE — 94799 UNLISTED PULMONARY SVC/PX: CPT

## 2025-01-22 PROCEDURE — 80048 BASIC METABOLIC PNL TOTAL CA: CPT | Performed by: STUDENT IN AN ORGANIZED HEALTH CARE EDUCATION/TRAINING PROGRAM

## 2025-01-22 PROCEDURE — 80306 DRUG TEST PRSMV INSTRMNT: CPT | Performed by: STUDENT IN AN ORGANIZED HEALTH CARE EDUCATION/TRAINING PROGRAM

## 2025-01-22 PROCEDURE — 86900 BLOOD TYPING SEROLOGIC ABO: CPT | Performed by: STUDENT IN AN ORGANIZED HEALTH CARE EDUCATION/TRAINING PROGRAM

## 2025-01-22 PROCEDURE — 86850 RBC ANTIBODY SCREEN: CPT | Performed by: STUDENT IN AN ORGANIZED HEALTH CARE EDUCATION/TRAINING PROGRAM

## 2025-01-22 RX ORDER — CALCIUM CARBONATE 500 MG/1
1 TABLET, CHEWABLE ORAL EVERY 4 HOURS PRN
Status: DISCONTINUED | OUTPATIENT
Start: 2025-01-22 | End: 2025-01-24 | Stop reason: HOSPADM

## 2025-01-22 RX ORDER — KETOROLAC TROMETHAMINE 30 MG/ML
15 INJECTION, SOLUTION INTRAMUSCULAR; INTRAVENOUS EVERY 6 HOURS
Status: COMPLETED | OUTPATIENT
Start: 2025-01-22 | End: 2025-01-23

## 2025-01-22 RX ORDER — EPHEDRINE SULFATE 50 MG/ML
INJECTION, SOLUTION INTRAVENOUS AS NEEDED
Status: DISCONTINUED | OUTPATIENT
Start: 2025-01-22 | End: 2025-01-22 | Stop reason: SURG

## 2025-01-22 RX ORDER — TRANEXAMIC ACID 10 MG/ML
INJECTION, SOLUTION INTRAVENOUS
Status: COMPLETED
Start: 2025-01-22 | End: 2025-01-22

## 2025-01-22 RX ORDER — ONDANSETRON 2 MG/ML
4 INJECTION INTRAMUSCULAR; INTRAVENOUS ONCE AS NEEDED
Status: ACTIVE | OUTPATIENT
Start: 2025-01-22 | End: 2025-01-23

## 2025-01-22 RX ORDER — SIMETHICONE 80 MG
80 TABLET,CHEWABLE ORAL
Status: DISCONTINUED | OUTPATIENT
Start: 2025-01-22 | End: 2025-01-24 | Stop reason: HOSPADM

## 2025-01-22 RX ORDER — ACETAMINOPHEN 500 MG
1000 TABLET ORAL ONCE
Status: COMPLETED | OUTPATIENT
Start: 2025-01-22 | End: 2025-01-22

## 2025-01-22 RX ORDER — BUPRENORPHINE 2 MG/1
8 TABLET SUBLINGUAL 2 TIMES DAILY
Status: DISCONTINUED | OUTPATIENT
Start: 2025-01-22 | End: 2025-01-24 | Stop reason: HOSPADM

## 2025-01-22 RX ORDER — OXYTOCIN/0.9 % SODIUM CHLORIDE 30/500 ML
PLASTIC BAG, INJECTION (ML) INTRAVENOUS CONTINUOUS PRN
Status: DISCONTINUED | OUTPATIENT
Start: 2025-01-22 | End: 2025-01-22 | Stop reason: SURG

## 2025-01-22 RX ORDER — FAMOTIDINE 10 MG/ML
INJECTION, SOLUTION INTRAVENOUS AS NEEDED
Status: DISCONTINUED | OUTPATIENT
Start: 2025-01-22 | End: 2025-01-22 | Stop reason: SURG

## 2025-01-22 RX ORDER — ONDANSETRON 2 MG/ML
4 INJECTION INTRAMUSCULAR; INTRAVENOUS EVERY 6 HOURS PRN
Status: DISCONTINUED | OUTPATIENT
Start: 2025-01-22 | End: 2025-01-24 | Stop reason: HOSPADM

## 2025-01-22 RX ORDER — OXYTOCIN/0.9 % SODIUM CHLORIDE 30/500 ML
PLASTIC BAG, INJECTION (ML) INTRAVENOUS
Status: COMPLETED
Start: 2025-01-22 | End: 2025-01-22

## 2025-01-22 RX ORDER — PRENATAL VIT/IRON FUM/FOLIC AC 27MG-0.8MG
1 TABLET ORAL DAILY
Status: DISCONTINUED | OUTPATIENT
Start: 2025-01-22 | End: 2025-01-24 | Stop reason: HOSPADM

## 2025-01-22 RX ORDER — SODIUM CHLORIDE 0.9 % (FLUSH) 0.9 %
10 SYRINGE (ML) INJECTION AS NEEDED
Status: DISCONTINUED | OUTPATIENT
Start: 2025-01-22 | End: 2025-01-24 | Stop reason: HOSPADM

## 2025-01-22 RX ORDER — ACETAMINOPHEN 500 MG
1000 TABLET ORAL EVERY 6 HOURS
Status: COMPLETED | OUTPATIENT
Start: 2025-01-22 | End: 2025-01-23

## 2025-01-22 RX ORDER — PHENYLEPHRINE HYDROCHLORIDE 10 MG/ML
INJECTION INTRAVENOUS AS NEEDED
Status: DISCONTINUED | OUTPATIENT
Start: 2025-01-22 | End: 2025-01-22 | Stop reason: SURG

## 2025-01-22 RX ORDER — DOCUSATE SODIUM 100 MG/1
100 CAPSULE, LIQUID FILLED ORAL 2 TIMES DAILY
Status: DISCONTINUED | OUTPATIENT
Start: 2025-01-22 | End: 2025-01-24 | Stop reason: HOSPADM

## 2025-01-22 RX ORDER — KETOROLAC TROMETHAMINE 30 MG/ML
30 INJECTION, SOLUTION INTRAMUSCULAR; INTRAVENOUS ONCE
Status: COMPLETED | OUTPATIENT
Start: 2025-01-22 | End: 2025-01-22

## 2025-01-22 RX ORDER — TRANEXAMIC ACID 100 MG/ML
INJECTION, SOLUTION INTRAVENOUS
Status: DISCONTINUED
Start: 2025-01-22 | End: 2025-01-22 | Stop reason: WASHOUT

## 2025-01-22 RX ORDER — SODIUM CHLORIDE 0.9 % (FLUSH) 0.9 %
10 SYRINGE (ML) INJECTION EVERY 12 HOURS SCHEDULED
Status: DISCONTINUED | OUTPATIENT
Start: 2025-01-22 | End: 2025-01-24 | Stop reason: HOSPADM

## 2025-01-22 RX ORDER — OXYCODONE HYDROCHLORIDE 5 MG/1
5 TABLET ORAL EVERY 4 HOURS PRN
Status: DISCONTINUED | OUTPATIENT
Start: 2025-01-22 | End: 2025-01-24 | Stop reason: HOSPADM

## 2025-01-22 RX ORDER — SODIUM CHLORIDE, SODIUM LACTATE, POTASSIUM CHLORIDE, CALCIUM CHLORIDE 600; 310; 30; 20 MG/100ML; MG/100ML; MG/100ML; MG/100ML
125 INJECTION, SOLUTION INTRAVENOUS CONTINUOUS
Status: ACTIVE | OUTPATIENT
Start: 2025-01-22 | End: 2025-01-23

## 2025-01-22 RX ORDER — TRANEXAMIC ACID 10 MG/ML
INJECTION, SOLUTION INTRAVENOUS AS NEEDED
Status: DISCONTINUED | OUTPATIENT
Start: 2025-01-22 | End: 2025-01-22 | Stop reason: SURG

## 2025-01-22 RX ORDER — MORPHINE SULFATE 1 MG/ML
INJECTION, SOLUTION EPIDURAL; INTRATHECAL; INTRAVENOUS AS NEEDED
Status: DISCONTINUED | OUTPATIENT
Start: 2025-01-22 | End: 2025-01-22 | Stop reason: SURG

## 2025-01-22 RX ORDER — OXYTOCIN/0.9 % SODIUM CHLORIDE 30/500 ML
999 PLASTIC BAG, INJECTION (ML) INTRAVENOUS ONCE
Status: COMPLETED | OUTPATIENT
Start: 2025-01-22 | End: 2025-01-22

## 2025-01-22 RX ORDER — OXYTOCIN/0.9 % SODIUM CHLORIDE 30/500 ML
125 PLASTIC BAG, INJECTION (ML) INTRAVENOUS ONCE AS NEEDED
Status: DISCONTINUED | OUTPATIENT
Start: 2025-01-22 | End: 2025-01-24 | Stop reason: HOSPADM

## 2025-01-22 RX ORDER — GABAPENTIN 400 MG/1
800 CAPSULE ORAL 3 TIMES DAILY
Status: DISCONTINUED | OUTPATIENT
Start: 2025-01-22 | End: 2025-01-24 | Stop reason: HOSPADM

## 2025-01-22 RX ORDER — MISOPROSTOL 200 UG/1
800 TABLET ORAL ONCE AS NEEDED
Status: DISCONTINUED | OUTPATIENT
Start: 2025-01-22 | End: 2025-01-24 | Stop reason: HOSPADM

## 2025-01-22 RX ORDER — OXYCODONE HYDROCHLORIDE 5 MG/1
10 TABLET ORAL EVERY 4 HOURS PRN
Status: DISCONTINUED | OUTPATIENT
Start: 2025-01-22 | End: 2025-01-24 | Stop reason: HOSPADM

## 2025-01-22 RX ORDER — SODIUM CHLORIDE 9 MG/ML
40 INJECTION, SOLUTION INTRAVENOUS AS NEEDED
Status: DISCONTINUED | OUTPATIENT
Start: 2025-01-22 | End: 2025-01-24 | Stop reason: HOSPADM

## 2025-01-22 RX ORDER — CARBOPROST TROMETHAMINE 250 UG/ML
250 INJECTION, SOLUTION INTRAMUSCULAR
Status: DISCONTINUED | OUTPATIENT
Start: 2025-01-22 | End: 2025-01-24 | Stop reason: HOSPADM

## 2025-01-22 RX ORDER — ACETAMINOPHEN 325 MG/1
650 TABLET ORAL EVERY 6 HOURS
Status: DISCONTINUED | OUTPATIENT
Start: 2025-01-23 | End: 2025-01-24 | Stop reason: HOSPADM

## 2025-01-22 RX ORDER — BUPIVACAINE HYDROCHLORIDE 7.5 MG/ML
INJECTION, SOLUTION EPIDURAL; RETROBULBAR
Status: COMPLETED | OUTPATIENT
Start: 2025-01-22 | End: 2025-01-22

## 2025-01-22 RX ORDER — ALUMINA, MAGNESIA, AND SIMETHICONE 2400; 2400; 240 MG/30ML; MG/30ML; MG/30ML
15 SUSPENSION ORAL EVERY 4 HOURS PRN
Status: DISCONTINUED | OUTPATIENT
Start: 2025-01-22 | End: 2025-01-24 | Stop reason: HOSPADM

## 2025-01-22 RX ORDER — IBUPROFEN 600 MG/1
600 TABLET, FILM COATED ORAL EVERY 6 HOURS
Status: DISCONTINUED | OUTPATIENT
Start: 2025-01-23 | End: 2025-01-24 | Stop reason: HOSPADM

## 2025-01-22 RX ORDER — METHYLERGONOVINE MALEATE 0.2 MG/ML
200 INJECTION INTRAVENOUS ONCE AS NEEDED
Status: DISCONTINUED | OUTPATIENT
Start: 2025-01-22 | End: 2025-01-24 | Stop reason: HOSPADM

## 2025-01-22 RX ORDER — OXYTOCIN/0.9 % SODIUM CHLORIDE 30/500 ML
250 PLASTIC BAG, INJECTION (ML) INTRAVENOUS CONTINUOUS
Status: ACTIVE | OUTPATIENT
Start: 2025-01-22 | End: 2025-01-22

## 2025-01-22 RX ORDER — FAMOTIDINE 10 MG/ML
INJECTION, SOLUTION INTRAVENOUS
Status: COMPLETED
Start: 2025-01-22 | End: 2025-01-22

## 2025-01-22 RX ORDER — ONDANSETRON 4 MG/1
4 TABLET, ORALLY DISINTEGRATING ORAL EVERY 8 HOURS PRN
Status: DISCONTINUED | OUTPATIENT
Start: 2025-01-22 | End: 2025-01-24 | Stop reason: HOSPADM

## 2025-01-22 RX ORDER — LIDOCAINE HYDROCHLORIDE 10 MG/ML
0.5 INJECTION, SOLUTION EPIDURAL; INFILTRATION; INTRACAUDAL; PERINEURAL ONCE AS NEEDED
Status: DISCONTINUED | OUTPATIENT
Start: 2025-01-22 | End: 2025-01-24 | Stop reason: HOSPADM

## 2025-01-22 RX ADMIN — SIMETHICONE 80 MG: 80 TABLET, CHEWABLE ORAL at 21:23

## 2025-01-22 RX ADMIN — KETOROLAC TROMETHAMINE 15 MG: 30 INJECTION, SOLUTION INTRAMUSCULAR; INTRAVENOUS at 17:22

## 2025-01-22 RX ADMIN — PRENATAL VIT W/ FE FUMARATE-FA TAB 27-0.8 MG 1 TABLET: 27-0.8 TAB at 10:32

## 2025-01-22 RX ADMIN — FAMOTIDINE 20 MG: 10 INJECTION INTRAVENOUS at 07:25

## 2025-01-22 RX ADMIN — SIMETHICONE 80 MG: 80 TABLET, CHEWABLE ORAL at 10:32

## 2025-01-22 RX ADMIN — DOCUSATE SODIUM 100 MG: 100 CAPSULE, LIQUID FILLED ORAL at 10:32

## 2025-01-22 RX ADMIN — CEFAZOLIN 2000 MG: 2 INJECTION, POWDER, FOR SOLUTION INTRAMUSCULAR; INTRAVENOUS at 07:50

## 2025-01-22 RX ADMIN — PHENYLEPHRINE HYDROCHLORIDE 100 MCG: 10 INJECTION INTRAVENOUS at 07:45

## 2025-01-22 RX ADMIN — KETOROLAC TROMETHAMINE 30 MG: 30 INJECTION, SOLUTION INTRAMUSCULAR; INTRAVENOUS at 10:32

## 2025-01-22 RX ADMIN — ACETAMINOPHEN 1000 MG: 500 TABLET, FILM COATED ORAL at 21:23

## 2025-01-22 RX ADMIN — Medication 999 ML/HR: at 08:50

## 2025-01-22 RX ADMIN — PHENYLEPHRINE HYDROCHLORIDE 100 MCG: 10 INJECTION INTRAVENOUS at 07:55

## 2025-01-22 RX ADMIN — SODIUM CHLORIDE, POTASSIUM CHLORIDE, SODIUM LACTATE AND CALCIUM CHLORIDE: 600; 310; 30; 20 INJECTION, SOLUTION INTRAVENOUS at 07:37

## 2025-01-22 RX ADMIN — BUPIVACAINE HYDROCHLORIDE 1.5 ML: 7.5 INJECTION, SOLUTION EPIDURAL; RETROBULBAR at 07:42

## 2025-01-22 RX ADMIN — ACETAMINOPHEN 1000 MG: 500 TABLET, FILM COATED ORAL at 14:11

## 2025-01-22 RX ADMIN — EPHEDRINE SULFATE 5 MG: 50 INJECTION, SOLUTION INTRAVENOUS at 07:50

## 2025-01-22 RX ADMIN — ACETAMINOPHEN 1000 MG: 500 TABLET, FILM COATED ORAL at 07:15

## 2025-01-22 RX ADMIN — MORPHINE SULFATE 0.15 MG: 1 INJECTION, SOLUTION EPIDURAL; INTRATHECAL; INTRAVENOUS at 07:45

## 2025-01-22 RX ADMIN — Medication 250 ML/HR: at 09:04

## 2025-01-22 RX ADMIN — DOCUSATE SODIUM 100 MG: 100 CAPSULE, LIQUID FILLED ORAL at 21:23

## 2025-01-22 RX ADMIN — OXYTOCIN-SODIUM CHLORIDE 0.9% IV SOLN 30 UNIT/500ML 500 ML/HR: 30-0.9/5 SOLUTION at 08:10

## 2025-01-22 RX ADMIN — GABAPENTIN 800 MG: 400 CAPSULE ORAL at 21:23

## 2025-01-22 RX ADMIN — BUPRENORPHINE 8 MG: 2 TABLET SUBLINGUAL at 21:24

## 2025-01-22 RX ADMIN — PHENYLEPHRINE HYDROCHLORIDE 100 MCG: 10 INJECTION INTRAVENOUS at 07:50

## 2025-01-22 RX ADMIN — SODIUM CHLORIDE, POTASSIUM CHLORIDE, SODIUM LACTATE AND CALCIUM CHLORIDE 1000 ML: 600; 310; 30; 20 INJECTION, SOLUTION INTRAVENOUS at 06:30

## 2025-01-22 RX ADMIN — ONDANSETRON 4 MG: 2 INJECTION INTRAMUSCULAR; INTRAVENOUS at 07:25

## 2025-01-22 RX ADMIN — TRANEXAMIC ACID 1000 MG: 10 INJECTION, SOLUTION INTRAVENOUS at 08:25

## 2025-01-22 NOTE — PLAN OF CARE
Problem: Adult Inpatient Plan of Care  Goal: Plan of Care Review  Outcome: Progressing  Flowsheets (Taken 1/22/2025 1815)  Progress: improving  Outcome Evaluation: VSS. fundus firm and midline. lochia rubra and scant. pt reports pain controlled with scheduled tylenol and toradol. pt ambulated to bathroom. pearson in place, output adequate. pt breastfeeding and bonding well with baby.  Plan of Care Reviewed With:   patient   spouse     Problem: Adult Inpatient Plan of Care  Goal: Plan of Care Review  Outcome: Progressing  Flowsheets (Taken 1/22/2025 1815)  Progress: improving  Outcome Evaluation: VSS. fundus firm and midline. lochia rubra and scant. pt reports pain controlled with scheduled tylenol and toradol. pt ambulated to bathroom. pearson in place, output adequate. pt breastfeeding and bonding well with baby.  Plan of Care Reviewed With:   patient   spouse  Goal: Patient-Specific Goal (Individualized)  Outcome: Progressing  Flowsheets (Taken 1/22/2025 1815)  Patient/Family-Specific Goals (Include Timeframe): calling out before feedings for blood glucose checks and feeding every 2 hours  Goal: Absence of Hospital-Acquired Illness or Injury  Outcome: Progressing  Intervention: Identify and Manage Fall Risk  Recent Flowsheet Documentation  Taken 1/22/2025 1615 by Hans Quinonez RN  Safety Promotion/Fall Prevention: (Simultaneous filing. User may be unaware of other data.) safety round/check completed  Intervention: Prevent and Manage VTE (Venous Thromboembolism) Risk  Recent Flowsheet Documentation  Taken 1/22/2025 0851 by Hans Quinonez, RN  VTE Prevention/Management:   bilateral   SCDs (sequential compression devices) on  Goal: Optimal Comfort and Wellbeing  Outcome: Progressing  Intervention: Monitor Pain and Promote Comfort  Recent Flowsheet Documentation  Taken 1/22/2025 1411 by Hans Quinonez, RN  Pain Management Interventions: pain medication given  Intervention: Provide Person-Centered Care  Recent Flowsheet  Documentation  Taken 2025 0851 by Hans Quinonez RN  Trust Relationship/Rapport:   care explained   choices provided   emotional support provided   empathic listening provided   questions answered   questions encouraged   reassurance provided   thoughts/feelings acknowledged  Goal: Readiness for Transition of Care  Outcome: Progressing     Problem:  Delivery  Goal: Bleeding is Controlled  Outcome: Progressing  Goal: Stable Fetal Wellbeing  Outcome: Progressing  Goal: Absence of Infection Signs and Symptoms  Outcome: Progressing  Goal: Effective Oxygenation and Ventilation  Outcome: Progressing     Problem: Postpartum ( Delivery)  Goal: Successful Parent Role Transition  Outcome: Progressing  Goal: Hemostasis  Outcome: Progressing  Goal: Effective Bowel Elimination  Outcome: Progressing  Goal: Fluid and Electrolyte Balance  Outcome: Progressing  Goal: Absence of Infection Signs and Symptoms  Outcome: Progressing  Goal: Anesthesia/Sedation Recovery  Outcome: Progressing  Intervention: Optimize Anesthesia Recovery  Recent Flowsheet Documentation  Taken 2025 1615 by Hans Quinonez RN  Safety Promotion/Fall Prevention: (Simultaneous filing. User may be unaware of other data.) safety round/check completed  Goal: Optimal Pain Control and Function  Outcome: Progressing  Intervention: Prevent or Manage Pain  Recent Flowsheet Documentation  Taken 2025 1615 by Hans Quinonez RN  Perineal Care:   absorbent brief/pad changed   catheter care provided   perineal spray bottle/warm water use encouraged   perineum cleansed  Taken 2025 1411 by Hans Quinonez RN  Pain Management Interventions: pain medication given  Goal: Nausea and Vomiting Relief  Outcome: Progressing  Goal: Effective Urinary Elimination  Outcome: Progressing  Goal: Effective Oxygenation and Ventilation  Outcome: Progressing   Goal Outcome Evaluation:  Plan of Care Reviewed With: patient, spouse        Progress: improving  Outcome  Evaluation: VSS. fundus firm and midline. lochia rubra and scant. pt reports pain controlled with scheduled tylenol and toradol. pt ambulated to bathroom. pearson in place, output adequate. pt breastfeeding and bonding well with baby.

## 2025-01-22 NOTE — H&P
Patient Care Team:  Aj Sen MD as PCP - General (Family Medicine)  Jolanta Castellanos APRN as Nurse Practitioner (Obstetrics and Gynecology)  Kamran Kamara MD as Consulting Physician (Obstetrics and Gynecology)    Chief complaint RLTCS        HPI: 36yo  @ 39+2 presents for ERLTCS. Denies labor complaints     ROS:   Constitutional: Weight change and appetite change present.   Respiratory: Negative for cough and shortness of breath.    Cardiovascular: Negative for chest pain and palpitations.   Gastrointestinal: Neg    Endocrine: Negative.    Genitourinary: + missed menses, no dysuria   Skin: Negative.    Neurological: Negative for dizziness and headaches.   Hematological: Negative.    Psychiatric/Behavioral: Negative for dysphoric mood and sleep disturbance. The patient is not nervous/anxious.        PMH:   Past Medical History:   Diagnosis Date    HPV (human papilloma virus) infection 2008    LGSIL on Pap smear of cervix 2008    Migraine     Ovarian cyst          PSH:   Past Surgical History:   Procedure Laterality Date     SECTION N/A 2022    Procedure:  SECTION PRIMARY;  Surgeon: Milana Thompson DO;  Location: [facility] LABOR DELIVERY;  Service: Obstetrics/Gynecology;  Laterality: N/A;       SoHx:   Social History     Socioeconomic History    Marital status:    Tobacco Use    Smoking status: Every Day     Current packs/day: 0.50     Types: Cigarettes     Passive exposure: Current    Smokeless tobacco: Never   Vaping Use    Vaping status: Never Used   Substance and Sexual Activity    Alcohol use: No    Drug use: Not Currently     Types: Opium     Comment: stopped using in - started meds 2014    Sexual activity: Yes     Partners: Male       FHx:   Family History   Problem Relation Age of Onset    Depression Mother     Hyperlipidemia Father     Hypertension Father     Hypertension Sister     Asthma Brother        PGyn Hx: Defer    POBHx:   LTCSx1       Allergies: Benadryl [diphenhydramine]    Medications:   No current facility-administered medications on file prior to encounter.     Current Outpatient Medications on File Prior to Encounter   Medication Sig Dispense Refill    BABY ASPIRIN PO Take  by mouth.      buprenorphine (SUBUTEX) 8 MG sublingual tablet SL tablet Place 1 tablet under the tongue 2 (Two) Times a Day.      doxylamine (UNISOM) 25 MG tablet Take 1 tablet by mouth At Night As Needed for Sleep.      gabapentin (NEURONTIN) 800 MG tablet Take 1 tablet by mouth 3 (Three) Times a Day.      SUMAtriptan (IMITREX) 50 MG tablet Take 1 tablet by mouth.      vitamin B-6 (PYRIDOXINE) 50 MG tablet Take 1 tablet by mouth Daily.      cyclobenzaprine (FLEXERIL) 10 MG tablet  (Patient not taking: Reported on 2025)      nicotine (Nicoderm CQ) 7 MG/24HR patch Place 1 patch on the skin as directed by provider Daily. (Patient not taking: Reported on 2025) 14 each 1    Prenatal Vit-Fe Fumarate-FA (prenatal vitamin 27-0.8) 27-0.8 MG tablet tablet Take 1 tablet by mouth Daily.               Vital Signs  There were no vitals filed for this visit.        Physical Exam:  Constitutional: She is oriented to person, place, and time. She appears well-developed and well-nourished.   HENT:   Head: Normocephalic and atraumatic.   Cardiovascular: Normal rate and regular rhythm.    Pulmonary/Chest: Effort normal. No respiratory distress.   Abdominal: Soft. Bowel sounds are normal. There is no tenderness. There is no rebound and no guarding.   Musculoskeletal: Normal range of motion.   Neurological: She is alert and oriented to person, place, and time.   Skin: Skin is warm and dry.   Psychiatric: She has a normal mood and affect. Her behavior is normal. Judgment and thought content normal.   Nursing note and vitals reviewed.  Debilities/Disabilities Identified: None  Emotional Behavior: Appropriate    Labs: 12.3/35.8 plt 237      Assessment/Plan:   36yo   at 39+2 presents for RLTCS.   ANC - AMA, Hx of LTCS, Subutex 8mg bid, h/o CHTN . HCT35.8. QQW691.  Placenta anterior. NKDA    -Admit to L&D  -Consent signed/on chart  -Anesthesia consult  -[facility] for SSI prophylaxis  -SCDs for VTE prophylaxis      The patient was counseled regarding the diagnosis and indications for  delivery and the procedure was explained in detail. We then discussed the risks, benefits and alternatives for  delivery. She was informed of the risks which include, but are not limited to, bleeding, need for blood transfusion with associated risk of transfusion reaction or transfusion-associated infection (CMV, HIV, viral hepatitis), need for hysterectomy as a life saving measure, damage to bowel, bladder or other internal organs requiring further surgery to repair or remove damaged organs, injury to the fetus (hypoxia, laceration, traumatic injury), infection of pelvic tissues or wound, wound separation, venous thrombosis, nerve damage, chronic pelvic pain, impaired fertility, need for  delivery in subsequent pregnancies, anesthetic complications and death. We discussed the alternative to  delivery, which is labor and attempted vaginal delivery and the risks that this would entail for her and her fetus. She verbalizes understanding of the above and consents to the proposed  delivery.     I discussed the patients findings and my recommendations with patient, family, and nursing staff.         Matheus Duarte DO  25  07:03 EST

## 2025-01-22 NOTE — OP NOTE
[place]   Section Operative Note    Pre-Operative Dx:   1) 35 y.o.   2) IUP at 39+2  3) Indications for  section: RLTCS          Postoperative dx:     Same, plus:  Hx of LTCS, Subutex 8mg bid, h/o CHTN      Procedure: , Low Transverse  Lysis of adhesions ( Dense) ; bladder    Surgeon:   Matheus Duarte    Assistant: Aj Humphries CFA   Anesthesia:  Spinal    EBL:   600 mls.           IV Fluids: 1200 mls.   UOP: 100 mls.    I/O this shift:  In: 100 [IV Piggyback:100]  Out: 388 [Blood:388]   Antibiotics: cefazolin (Ancef)     Infant:      Time of Delivery RLTCS    Gender: male infant    Weight: 2665 g (5 lb 14 oz)    Apgars: 8  @ 1 minute /     9  @ 5 minutes      Meconium:   Nuchal Cord:    no  yes            Indication for C/Section:                        Prior Uterine Surgery             Procedure Details:       Procedure:  Under epidural anaesthetic with a pearson catheter inserted, the patient was prepped and draped in the usual sterile fashion in the supine position with a leftward tilt. A Pfannensteil incision was made through the patients previous incision. The incision was carried down to the fascia with sharp dissection/cautery. The fascia was incised transversely and dissected off the rectus muscle using sharp dissection. Electrocautery was used for hemostasis. The peritoneum was opened taking care not to injure the bladder. The vesicouterine peritoneum was dissected off the lower uterine segment; dense adhesions noted; Taken down in standard technique. The lower segment was assessed and a low transverse incision was made. The uterine incision was extended bluntly.     The fetus was presenting as a vertex. The head was delivered without difficulty and the rest of the body followed easily. The cord was clamped twice and cut and the baby transferred to the warmer, awaiting the nursing/pediatric staff. The placenta was then delivered spontaneously. The uterus was explored and  was empty of all tissue. The uterus was exteriorized for better visualization. The uterine incision was then closed in two layers with vicryl suture. The first layer was locking and the second layer was imbricating. Also x2 figure of eights and Reyes utilized for hemostasis. Tubes and ovaries were examined and appeared normal. The peritoneum was closed in the standard fashion.     With the uterine closure complete and hemostasis achieved, attention was turned to the fascia. It was closed in a running unlocked fashion. The skin was then reapproximated with a running vicryl subcuticular suture and deep dermals placed x3. Dermal adhesive applied.     At the end of the procedure all sponges, instruments, and sharps were counted and correct. Estimated blood loss was 600 ml. The patient and baby were taken to the recovery in stable condition.     TXA 1 gram given         Complications:   None      Disposition:   Mother to Mother Baby/Postpartum  in stable condition currently.   Baby to remains with mom  in stable condition currently.       Matheus Duarte, DO  1/22/2025  08:46 EST

## 2025-01-22 NOTE — ANESTHESIA PROCEDURE NOTES
Spinal Block    Pre-sedation assessment completed: 1/22/2025 7:42 AM    Patient reassessed immediately prior to procedure    Start Time: 1/22/2025 7:42 AM  Stop Time: 1/22/2025 7:45 AM  Indication:at surgeon's request and post-op pain management  Performed By  CRNA/CAA: Flip Amaya CRNA  Preanesthetic Checklist  Completed: patient identified, IV checked, site marked, risks and benefits discussed, surgical consent, monitors and equipment checked, pre-op evaluation and timeout performed  Spinal Block Prep:  Patient Position:sitting  Sterile Tech:cap, gloves, mask and sterile barriers  Prep:Chloraprep  Patient Monitoring:blood pressure monitoring, continuous pulse oximetry and EKG    Spinal Block Procedure  Approach:midline  Guidance:landmark technique and palpation technique  Location:L3-L4  Needle Type:Sprotte  Needle Gauge:25 G  Placement of Spinal needle event:cerebrospinal fluid aspirated  Paresthesia: no  Fluid Appearance:clear  Medications: bupivacaine PF (MARCAINE) injection 0.75% - Epidural   1.5 mL - 1/22/2025 7:42:00 AM   Post Assessment  Patient Tolerance:patient tolerated the procedure well with no apparent complications  Complications no

## 2025-01-22 NOTE — ANESTHESIA POSTPROCEDURE EVALUATION
Patient: Rema Cortés    Procedure Summary       Date: 25 Room / Location: [facility] LABOR DELIVERY  [facility] LABOR DELIVERY    Anesthesia Start: 737 Anesthesia Stop: 847    Procedure:  SECTION REPEAT (Abdomen) Diagnosis:       S/P  section      (S/P  section [Z98.891])    Surgeons: Matheus Duarte DO Provider: Flip Amaya CRNA    Anesthesia Type: spinal ASA Status: 2            Anesthesia Type: spinal    Vitals  Vitals Value Taken Time   /89 25 1050   Temp 97.3 °F (36.3 °C) 25 0906   Pulse 69 25 1050   Resp 12 25 0944   SpO2 98 % 25 1140           Post Anesthesia Care and Evaluation    Patient location during evaluation: bedside  Patient participation: complete - patient participated  Level of consciousness: awake and alert  Pain score: 0  Pain management: adequate    Airway patency: patent  Anesthetic complications: No anesthetic complications  PONV Status: none  Cardiovascular status: acceptable  Respiratory status: acceptable  Hydration status: acceptable  Post Neuraxial Block status: Motor and sensory function returned to baseline and No signs or symptoms of PDPH

## 2025-01-22 NOTE — ANESTHESIA PREPROCEDURE EVALUATION
Anesthesia Evaluation     Patient summary reviewed and Nursing notes reviewed   no history of anesthetic complications:   NPO Solid Status: > 8 hours  NPO Liquid Status: > 2 hours           Airway   Mallampati: II  TM distance: >3 FB  Neck ROM: full  No difficulty expected  Dental - normal exam     Pulmonary - negative pulmonary ROS and normal exam    breath sounds clear to auscultation  Cardiovascular - negative cardio ROS and normal exam  Exercise tolerance: good (4-7 METS)    Rhythm: regular  Rate: normal        Neuro/Psych  (+) headaches    ROS Comment: Remote hx of substance abuse  GI/Hepatic/Renal/Endo - negative ROS     Musculoskeletal (-) negative ROS    Abdominal  - normal exam   Substance History - negative use     OB/GYN    (+) Pregnant        Other - negative ROS                         Anesthesia Plan    ASA 2     spinal     intravenous induction     Anesthetic plan, risks, benefits, and alternatives have been provided, discussed and informed consent has been obtained with: patient.  Pre-procedure education provided  Use of blood products discussed with patient  Consented to blood products.        CODE STATUS:    Level Of Support Discussed With: Patient  Code Status (Patient has no pulse and is not breathing): CPR (Attempt to Resuscitate)  Medical Interventions (Patient has pulse or is breathing): Full Support

## 2025-01-23 LAB
BASOPHILS # BLD AUTO: 0.06 10*3/MM3 (ref 0–0.2)
BASOPHILS NFR BLD AUTO: 0.4 % (ref 0–1.5)
DEPRECATED RDW RBC AUTO: 46.4 FL (ref 37–54)
EOSINOPHIL # BLD AUTO: 0.17 10*3/MM3 (ref 0–0.4)
EOSINOPHIL NFR BLD AUTO: 1.2 % (ref 0.3–6.2)
ERYTHROCYTE [DISTWIDTH] IN BLOOD BY AUTOMATED COUNT: 13.6 % (ref 12.3–15.4)
HCT VFR BLD AUTO: 33.2 % (ref 34–46.6)
HGB BLD-MCNC: 10.9 G/DL (ref 12–15.9)
IMM GRANULOCYTES # BLD AUTO: 0.05 10*3/MM3 (ref 0–0.05)
IMM GRANULOCYTES NFR BLD AUTO: 0.3 % (ref 0–0.5)
LYMPHOCYTES # BLD AUTO: 3.37 10*3/MM3 (ref 0.7–3.1)
LYMPHOCYTES NFR BLD AUTO: 23.2 % (ref 19.6–45.3)
MCH RBC QN AUTO: 30.4 PG (ref 26.6–33)
MCHC RBC AUTO-ENTMCNC: 32.8 G/DL (ref 31.5–35.7)
MCV RBC AUTO: 92.7 FL (ref 79–97)
MONOCYTES # BLD AUTO: 0.89 10*3/MM3 (ref 0.1–0.9)
MONOCYTES NFR BLD AUTO: 6.1 % (ref 5–12)
NEUTROPHILS NFR BLD AUTO: 68.8 % (ref 42.7–76)
NEUTROPHILS NFR BLD AUTO: 9.98 10*3/MM3 (ref 1.7–7)
NRBC BLD AUTO-RTO: 0 /100 WBC (ref 0–0.2)
PLATELET # BLD AUTO: 200 10*3/MM3 (ref 140–450)
PMV BLD AUTO: 9.4 FL (ref 6–12)
RBC # BLD AUTO: 3.58 10*6/MM3 (ref 3.77–5.28)
WBC NRBC COR # BLD AUTO: 14.52 10*3/MM3 (ref 3.4–10.8)

## 2025-01-23 PROCEDURE — 85025 COMPLETE CBC W/AUTO DIFF WBC: CPT | Performed by: STUDENT IN AN ORGANIZED HEALTH CARE EDUCATION/TRAINING PROGRAM

## 2025-01-23 PROCEDURE — 25010000002 KETOROLAC TROMETHAMINE PER 15 MG: Performed by: STUDENT IN AN ORGANIZED HEALTH CARE EDUCATION/TRAINING PROGRAM

## 2025-01-23 PROCEDURE — 0503F POSTPARTUM CARE VISIT: CPT | Performed by: NURSE PRACTITIONER

## 2025-01-23 RX ORDER — LABETALOL 100 MG/1
100 TABLET, FILM COATED ORAL 2 TIMES DAILY
Status: DISCONTINUED | OUTPATIENT
Start: 2025-01-23 | End: 2025-01-24 | Stop reason: HOSPADM

## 2025-01-23 RX ADMIN — KETOROLAC TROMETHAMINE 15 MG: 30 INJECTION, SOLUTION INTRAMUSCULAR; INTRAVENOUS at 02:30

## 2025-01-23 RX ADMIN — GABAPENTIN 800 MG: 400 CAPSULE ORAL at 21:09

## 2025-01-23 RX ADMIN — ACETAMINOPHEN 1000 MG: 500 TABLET, FILM COATED ORAL at 04:18

## 2025-01-23 RX ADMIN — BUPRENORPHINE 8 MG: 2 TABLET SUBLINGUAL at 08:32

## 2025-01-23 RX ADMIN — SIMETHICONE 80 MG: 80 TABLET, CHEWABLE ORAL at 08:32

## 2025-01-23 RX ADMIN — GABAPENTIN 800 MG: 400 CAPSULE ORAL at 08:32

## 2025-01-23 RX ADMIN — ACETAMINOPHEN 1000 MG: 500 TABLET, FILM COATED ORAL at 10:44

## 2025-01-23 RX ADMIN — DOCUSATE SODIUM 100 MG: 100 CAPSULE, LIQUID FILLED ORAL at 08:32

## 2025-01-23 RX ADMIN — DOCUSATE SODIUM 100 MG: 100 CAPSULE, LIQUID FILLED ORAL at 21:09

## 2025-01-23 RX ADMIN — ACETAMINOPHEN 650 MG: 325 TABLET ORAL at 18:36

## 2025-01-23 RX ADMIN — LABETALOL HYDROCHLORIDE 100 MG: 100 TABLET, FILM COATED ORAL at 21:09

## 2025-01-23 RX ADMIN — KETOROLAC TROMETHAMINE 15 MG: 30 INJECTION, SOLUTION INTRAMUSCULAR; INTRAVENOUS at 08:35

## 2025-01-23 RX ADMIN — IBUPROFEN 600 MG: 600 TABLET, FILM COATED ORAL at 20:26

## 2025-01-23 RX ADMIN — IBUPROFEN 600 MG: 600 TABLET, FILM COATED ORAL at 14:38

## 2025-01-23 RX ADMIN — SIMETHICONE 80 MG: 80 TABLET, CHEWABLE ORAL at 21:09

## 2025-01-23 RX ADMIN — PRENATAL VIT W/ FE FUMARATE-FA TAB 27-0.8 MG 1 TABLET: 27-0.8 TAB at 08:32

## 2025-01-23 RX ADMIN — BUPRENORPHINE 8 MG: 2 TABLET SUBLINGUAL at 21:10

## 2025-01-23 NOTE — PROGRESS NOTES
[place]   PROGRESS NOTE    Post-Op Day 1 S/P   Subjective   Subjective  Patient reports:  Pain is well controlled with  IV toradol .  She is up and ambulating. Tolerating diet. Tolerating po -- normal for liquids and normal for solids.  Intake -- c/o of tolerating po solids and tolerating po liquids.   Voiding - without difficulty; flatus reported..  Vaginal bleeding is as much as expected.    Objective    Objective     Vitals: Vital Signs Range for the last 24 hours  Temperature: Temp:  [97.3 °F (36.3 °C)-98.2 °F (36.8 °C)] 97.6 °F (36.4 °C)   Temp Source: Temp src: Oral   BP: BP: (117-154)/(66-97) 147/84   Pulse: Heart Rate:  [] 70   Respirations: Resp:  [12-16] 16   SPO2: SpO2:  [97 %-99 %] 99 %   O2 Amount (l/min):     O2 Devices Device (Oxygen Therapy): room air   Weight:              Physical Exam    Lungs clear to auscultation bilaterally   Abdomen Soft, bowel sounds present. Bowel sounds present.    Incision  Dressing C/D/I    Extremities edema trace and Homans sign is negative, no sign of DVT     I reviewed the patient's new clinical results.    Assessment & Plan        S/P  section    Pregnancy complicated by subutex maintenance, antepartum    Multigravida of advanced maternal age in third trimester    History of gestational hypertension    Placental abnormality, antepartum    Status post repeat low transverse  section    Assessment & Plan    Assessment:    Rema Cortés is Day 1  post-partum  , Low Transverse  .      Plan:   1) Postop day #1- S/P RLTCS. Rh +. Hgb 10.9g/dL.     2) Postpartum care- advance    3) Male infant- Breast. Desires circ.     4) H/O GHTN- P/C ratio 256. BPs labile. Continue to monitor.     5) H/O drug abuse- Taking Buprenorphine     6) Dispo- Consider home tomorrow       Leticia Rao, APRN  25  08:26 EST

## 2025-01-23 NOTE — PLAN OF CARE
Problem: Adult Inpatient Plan of Care  Goal: Plan of Care Review  Outcome: Progressing  Flowsheets (Taken 2025 170)  Progress: improving  Outcome Evaluation: VSS. fundus wdl. lochia wdl. pt reports pain controlled with scheduled tylenol and ibuprofen. pt voiding and ambulating. pt breastfeeding and bonding well with baby  Plan of Care Reviewed With: patient  Goal: Patient-Specific Goal (Individualized)  Outcome: Progressing  Flowsheets (Taken 2025 1701)  Patient/Family-Specific Goals (Include Timeframe): breastfeeding every 2-3 hours  Goal: Absence of Hospital-Acquired Illness or Injury  Outcome: Progressing  Intervention: Identify and Manage Fall Risk  Recent Flowsheet Documentation  Taken 2025 1615 by Kristie Moran RN  Safety Promotion/Fall Prevention: safety round/check completed  Taken 2025 0835 by Kristie Moran RN  Safety Promotion/Fall Prevention: safety round/check completed  Goal: Optimal Comfort and Wellbeing  Outcome: Progressing  Intervention: Monitor Pain and Promote Comfort  Recent Flowsheet Documentation  Taken 2025 1615 by Kristie Moran RN  Pain Management Interventions: no interventions per patient request  Taken 2025 1438 by Kristie Moran RN  Pain Management Interventions: pain medication given  Taken 2025 0835 by Kristie Moran RN  Pain Management Interventions: pain medication given  Intervention: Provide Person-Centered Care  Recent Flowsheet Documentation  Taken 2025 0835 by Kristie Moran RN  Trust Relationship/Rapport:   care explained   choices provided   questions answered   emotional support provided   empathic listening provided   questions encouraged   reassurance provided   thoughts/feelings acknowledged  Goal: Readiness for Transition of Care  Outcome: Progressing     Problem: Postpartum ( Delivery)  Goal: Successful Parent Role Transition  Outcome: Progressing  Goal: Hemostasis  Outcome:  Progressing  Goal: Effective Bowel Elimination  Outcome: Progressing  Goal: Fluid and Electrolyte Balance  Outcome: Progressing  Goal: Absence of Infection Signs and Symptoms  Outcome: Progressing  Goal: Anesthesia/Sedation Recovery  Outcome: Progressing  Intervention: Optimize Anesthesia Recovery  Recent Flowsheet Documentation  Taken 1/23/2025 1615 by Kristie Moran RN  Safety Promotion/Fall Prevention: safety round/check completed  Taken 1/23/2025 0835 by Kristie Moran RN  Safety Promotion/Fall Prevention: safety round/check completed  Goal: Optimal Pain Control and Function  Outcome: Progressing  Intervention: Prevent or Manage Pain  Recent Flowsheet Documentation  Taken 1/23/2025 1615 by Kristie Moran RN  Pain Management Interventions: no interventions per patient request  Taken 1/23/2025 1438 by Kristie Moran RN  Pain Management Interventions: pain medication given  Taken 1/23/2025 0835 by Kristie Moran RN  Pain Management Interventions: pain medication given  Goal: Nausea and Vomiting Relief  Outcome: Progressing  Goal: Effective Urinary Elimination  Outcome: Progressing  Goal: Effective Oxygenation and Ventilation  Outcome: Progressing   Goal Outcome Evaluation:  Plan of Care Reviewed With: patient        Progress: improving  Outcome Evaluation: VSS. fundus wdl. lochia wdl. pt reports pain controlled with scheduled tylenol and ibuprofen. pt voiding and ambulating. pt breastfeeding and bonding well with baby

## 2025-01-23 NOTE — PROGRESS NOTES
Patient: Rema Cortés  Procedure(s):   SECTION REPEAT  Anesthesia type: spinal    Patient location: Labor and Delivery  Vitals:    25 1557 25 2000 25 0400 25 0809   BP: 136/66 150/93 140/80 147/84   BP Location: Left arm Left arm Left arm Left arm   Patient Position: Sitting Sitting Sitting Sitting   Pulse: 72 75 73 70   Resp: 13 16 16 16   Temp: 98.2 °F (36.8 °C) 97.7 °F (36.5 °C) 97.8 °F (36.6 °C) 97.6 °F (36.4 °C)   TempSrc: Oral Oral Oral Oral   SpO2: 97% 99% 98% 99%     Level of consciousness: awake, alert, and oriented    Post-anesthesia pain: adequate analgesia  Airway patency: patent  Respiratory: unassisted  Cardiovascular: stable and blood pressure at baseline  Hydration: euvolemic    Anesthetic complications: no

## 2025-01-24 VITALS
TEMPERATURE: 97.4 F | HEART RATE: 91 BPM | DIASTOLIC BLOOD PRESSURE: 83 MMHG | OXYGEN SATURATION: 99 % | SYSTOLIC BLOOD PRESSURE: 145 MMHG | RESPIRATION RATE: 20 BRPM

## 2025-01-24 PROCEDURE — 0503F POSTPARTUM CARE VISIT: CPT | Performed by: NURSE PRACTITIONER

## 2025-01-24 RX ORDER — LABETALOL 100 MG/1
100 TABLET, FILM COATED ORAL 2 TIMES DAILY
Qty: 60 TABLET | Refills: 0 | Status: SHIPPED | OUTPATIENT
Start: 2025-01-24 | End: 2025-01-24

## 2025-01-24 RX ORDER — LABETALOL 100 MG/1
100 TABLET, FILM COATED ORAL 2 TIMES DAILY
Qty: 60 TABLET | Refills: 0 | Status: SHIPPED | OUTPATIENT
Start: 2025-01-24

## 2025-01-24 RX ADMIN — SIMETHICONE 80 MG: 80 TABLET, CHEWABLE ORAL at 08:04

## 2025-01-24 RX ADMIN — IBUPROFEN 600 MG: 600 TABLET, FILM COATED ORAL at 08:05

## 2025-01-24 RX ADMIN — BUPRENORPHINE 8 MG: 2 TABLET SUBLINGUAL at 08:03

## 2025-01-24 RX ADMIN — LABETALOL HYDROCHLORIDE 100 MG: 100 TABLET, FILM COATED ORAL at 08:04

## 2025-01-24 RX ADMIN — ACETAMINOPHEN 650 MG: 325 TABLET ORAL at 00:38

## 2025-01-24 RX ADMIN — GABAPENTIN 800 MG: 400 CAPSULE ORAL at 08:04

## 2025-01-24 RX ADMIN — ACETAMINOPHEN 650 MG: 325 TABLET ORAL at 06:34

## 2025-01-24 RX ADMIN — PRENATAL VIT W/ FE FUMARATE-FA TAB 27-0.8 MG 1 TABLET: 27-0.8 TAB at 08:05

## 2025-01-24 RX ADMIN — IBUPROFEN 600 MG: 600 TABLET, FILM COATED ORAL at 02:29

## 2025-01-24 RX ADMIN — DOCUSATE SODIUM 100 MG: 100 CAPSULE, LIQUID FILLED ORAL at 08:05

## 2025-01-24 NOTE — PLAN OF CARE
Problem: Adult Inpatient Plan of Care  Goal: Plan of Care Review  Outcome: Progressing  Flowsheets  Taken 1/24/2025 0620 by Gosia Valencia RN  Outcome Evaluation: VSS. Fundus WNL, Lochia WNL. Pain controlled with PO meds. Up ad martha. Breast feeding. Bonding well with infant.  Plan of Care Reviewed With: patient  Taken 1/23/2025 1701 by Kristie Moran RN  Progress: improving  Goal: Patient-Specific Goal (Individualized)  Outcome: Progressing  Flowsheets  Taken 1/24/2025 0620 by Gosia Valencia RN  Individualized Care Needs: keep informed of POC  Taken 1/23/2025 1701 by Kristie Moran RN  Patient/Family-Specific Goals (Include Timeframe): breastfeeding every 2-3 hours  Goal: Absence of Hospital-Acquired Illness or Injury  Outcome: Progressing  Intervention: Identify and Manage Fall Risk  Recent Flowsheet Documentation  Taken 1/24/2025 0229 by Gosia Valencia RN  Safety Promotion/Fall Prevention: safety round/check completed  Taken 1/23/2025 1942 by Gosia Valencia RN  Safety Promotion/Fall Prevention: safety round/check completed  Intervention: Prevent and Manage VTE (Venous Thromboembolism) Risk  Recent Flowsheet Documentation  Taken 1/23/2025 1942 by Gosia Valencia RN  VTE Prevention/Management: (up ad martha) other (see comments)  Goal: Optimal Comfort and Wellbeing  Outcome: Progressing  Intervention: Monitor Pain and Promote Comfort  Recent Flowsheet Documentation  Taken 1/24/2025 0229 by Gosia Valencia RN  Pain Management Interventions: pain medication given  Taken 1/24/2025 0038 by Gosia Valencia RN  Pain Management Interventions: pain medication given  Taken 1/23/2025 1942 by Gosia Valencia RN  Pain Management Interventions: pain management plan reviewed with patient/caregiver  Intervention: Provide Person-Centered Care  Recent Flowsheet Documentation  Taken 1/24/2025 0229 by Gosia Valencia RN  Trust Relationship/Rapport:   care explained   choices provided   emotional  support provided   empathic listening provided   questions answered   questions encouraged   reassurance provided   thoughts/feelings acknowledged  Taken 2025 by Gosia Valencia RN  Trust Relationship/Rapport:   care explained   choices provided   emotional support provided   empathic listening provided   questions answered   questions encouraged   reassurance provided   thoughts/feelings acknowledged  Goal: Readiness for Transition of Care  Outcome: Progressing     Problem: Postpartum ( Delivery)  Goal: Successful Parent Role Transition  Outcome: Progressing  Goal: Hemostasis  Outcome: Progressing  Goal: Effective Bowel Elimination  Outcome: Progressing  Goal: Fluid and Electrolyte Balance  Outcome: Progressing  Goal: Absence of Infection Signs and Symptoms  Outcome: Progressing  Goal: Anesthesia/Sedation Recovery  Outcome: Progressing  Intervention: Optimize Anesthesia Recovery  Recent Flowsheet Documentation  Taken 2025 by Gosia Valencia RN  Safety Promotion/Fall Prevention: safety round/check completed  Taken 2025 by Gosia Valencia RN  Safety Promotion/Fall Prevention: safety round/check completed  Goal: Optimal Pain Control and Function  Outcome: Progressing  Intervention: Prevent or Manage Pain  Recent Flowsheet Documentation  Taken 2025 by Gosia Valencia RN  Pain Management Interventions: pain medication given  Taken 2025 by Gosia Valencia RN  Pain Management Interventions: pain medication given  Taken 2025 by Gosia Valencia RN  Pain Management Interventions: pain management plan reviewed with patient/caregiver  Goal: Nausea and Vomiting Relief  Outcome: Progressing  Goal: Effective Urinary Elimination  Outcome: Progressing  Goal: Effective Oxygenation and Ventilation  Outcome: Progressing   Goal Outcome Evaluation:  Plan of Care Reviewed With: patient           Outcome Evaluation: VSS. Fundus WNL, Lochia WNL. Pain  controlled with PO meds. Up ad martha. Breast feeding. Bonding well with infant.

## 2025-01-24 NOTE — PLAN OF CARE
Problem: Adult Inpatient Plan of Care  Goal: Plan of Care Review  Outcome: Met  Goal: Patient-Specific Goal (Individualized)  Outcome: Met  Goal: Absence of Hospital-Acquired Illness or Injury  Outcome: Met  Intervention: Identify and Manage Fall Risk  Recent Flowsheet Documentation  Taken 2025 by Chantale Barton RN  Safety Promotion/Fall Prevention: safety round/check completed  Goal: Optimal Comfort and Wellbeing  Outcome: Met  Intervention: Monitor Pain and Promote Comfort  Recent Flowsheet Documentation  Taken 2025 by Chantale Barton RN  Pain Management Interventions: pain medication given  Intervention: Provide Person-Centered Care  Recent Flowsheet Documentation  Taken 2025 by Chantale Barton RN  Trust Relationship/Rapport:   care explained   choices provided  Goal: Readiness for Transition of Care  Outcome: Met     Problem: Postpartum ( Delivery)  Goal: Successful Parent Role Transition  Outcome: Met  Goal: Hemostasis  Outcome: Met  Goal: Effective Bowel Elimination  Outcome: Met  Goal: Fluid and Electrolyte Balance  Outcome: Met  Goal: Absence of Infection Signs and Symptoms  Outcome: Met  Goal: Anesthesia/Sedation Recovery  Outcome: Met  Intervention: Optimize Anesthesia Recovery  Recent Flowsheet Documentation  Taken 2025 by Chantale Barton RN  Safety Promotion/Fall Prevention: safety round/check completed  Goal: Optimal Pain Control and Function  Outcome: Met  Intervention: Prevent or Manage Pain  Recent Flowsheet Documentation  Taken 2025 by Chantale Barton RN  Pain Management Interventions: pain medication given  Goal: Nausea and Vomiting Relief  Outcome: Met  Goal: Effective Urinary Elimination  Outcome: Met  Goal: Effective Oxygenation and Ventilation  Outcome: Met   Goal Outcome Evaluation:

## 2025-01-24 NOTE — CASE MANAGEMENT/SOCIAL WORK
Case Management Discharge Note      Final Note: dc home         Selected Continued Care - Discharged on 1/24/2025 Admission date: 1/22/2025 - Discharge disposition: Home or Self Care      Destination    No services have been selected for the patient.                Durable Medical Equipment    No services have been selected for the patient.                Dialysis/Infusion    No services have been selected for the patient.                Home Medical Care    No services have been selected for the patient.                Therapy    No services have been selected for the patient.                Community Resources    No services have been selected for the patient.                Community & DME    No services have been selected for the patient.                         Final Discharge Disposition Code: 01 - home or self-care

## 2025-01-24 NOTE — DISCHARGE SUMMARY
Obstetrical Discharge Form    Primary OB Clinician: [facility]    Gestational Age: 39.2 weeks    Antepartum complications: Previous , GHTN, Smoker, Buprenorphine use    Date of Delivery:  2025    Delivered By:  Dr. Matheus Duarte    Delivery Type: repeat  section, low transverse incision    Tubal Ligation: n/a    Baby: Liveborn male, Apgars 8/9, weight 5 #, 14 oz,     Anesthesia: spinal    Intrapartum complications: None    Laceration: n/a      Feeding method: breast      Discharge Date: 2025; Discharge Time: 09:19 EST    /83 (BP Location: Right arm, Patient Position: Sitting)   Pulse 91   Temp 97.4 °F (36.3 °C) (Oral)   Resp 20   LMP 2024 (Exact Date)   SpO2 99%   Breastfeeding Unknown      Abd: soft, fundus firm, incision C/D/I   Ext: trace to 1+ edema, neg Wilman's sign  Results from last 7 days   Lab Units 25  0443   HEMOGLOBIN g/dL 10.9*       Impression: 1) Postop day #1- S/P RLTCS. Rh +. Hgb 10.9g/dL.  Requesting discharge today.      2) Male infant- Breast. Desires circ.      3) H/O GHTN- P/C ratio 256. Taking labetalol 100 mg BID      4) H/O drug abuse- Taking Buprenorphine     Plan:    Patient given written instruction sheet.  Follow-up appointment with [facility] in 1 week.    MARK Goode  09:19 EST  2025    Discharge time was less than 30 minutes

## 2025-01-27 LAB
LAB AP CASE REPORT: NORMAL
PATH REPORT.FINAL DX SPEC: NORMAL

## 2025-02-04 ENCOUNTER — MATERNAL SCREENING (OUTPATIENT)
Dept: CALL CENTER | Facility: HOSPITAL | Age: 35
End: 2025-02-04
Payer: COMMERCIAL

## 2025-02-04 NOTE — OUTREACH NOTE
Maternal Screening Survey      Flowsheet Row Responses   Eligibility Eligible   Prep survey completed? Yes   Facility patient discharged from? [facility] - Registered Nurse

## 2025-02-04 NOTE — OUTREACH NOTE
Maternal Screening Survey      Flowsheet Row Responses   Facility patient discharged from? [facility]   Attempt successful? No   Unsuccessful attempts Attempt 1              Claudia CEVALLOS - Registered Nurse

## 2025-02-05 ENCOUNTER — MATERNAL SCREENING (OUTPATIENT)
Dept: CALL CENTER | Facility: HOSPITAL | Age: 35
End: 2025-02-05
Payer: COMMERCIAL

## 2025-02-05 NOTE — OUTREACH NOTE
Maternal Screening Survey      Flowsheet Row Responses   Facility patient discharged from? [facility]   Attempt successful? Yes   Call start time 1021   Call end time 1023   Person spoke with today (if not patient) and relationship patient   I have been able to laugh and see the funny side of things. 0   I have looked forward with enjoyment to things. 0   I have blamed myself unnecessarily when things went wrong. 1   I have been anxious or worried for no good reason. 0   I have felt scared or panicky for no good reason. 0   Things have been getting on top of me. 0   I have been so unhappy that I have had difficulty sleeping. 0   I have felt sad or miserable. 0   I have been so unhappy that I have been crying. 0   The thought of harming myself has occurred to me. 0   [place]  Depression Scale Total 1   Did any of your parents have problems with alcohol or drug use? No   Do any of your peers have problems with alcohol or drug use? No   Does your partner have problems with alcohol or drug use? No   Before you were pregnant did you have problems with alcohol or drug use? (past) No   In the past month, did you drink beer, wine, liquor or use any other drugs? (pregnancy) No   Maternal Screening call completed Yes   Call end time 1023              Dariel KIM - Registered Nurse

## 2025-02-06 ENCOUNTER — POSTPARTUM VISIT (OUTPATIENT)
Dept: OBSTETRICS AND GYNECOLOGY | Facility: CLINIC | Age: 35
End: 2025-02-06
Payer: COMMERCIAL

## 2025-02-06 VITALS
HEIGHT: 65 IN | WEIGHT: 132 LBS | BODY MASS INDEX: 21.99 KG/M2 | DIASTOLIC BLOOD PRESSURE: 88 MMHG | SYSTOLIC BLOOD PRESSURE: 138 MMHG

## 2025-02-06 LAB
B-HCG UR QL: NEGATIVE
BILIRUB BLD-MCNC: NEGATIVE MG/DL
CLARITY, POC: CLEAR
COLOR UR: YELLOW
EXPIRATION DATE: NORMAL
GLUCOSE UR STRIP-MCNC: NEGATIVE MG/DL
INTERNAL NEGATIVE CONTROL: NEGATIVE
INTERNAL POSITIVE CONTROL: POSITIVE
KETONES UR QL: NEGATIVE
LEUKOCYTE EST, POC: NEGATIVE
Lab: 55
NITRITE UR-MCNC: NEGATIVE MG/ML
PH UR: 5 [PH] (ref 5–8)
PROT UR STRIP-MCNC: NEGATIVE MG/DL
RBC # UR STRIP: NEGATIVE /UL
SP GR UR: 1 (ref 1–1.03)
UROBILINOGEN UR QL: NORMAL

## 2025-02-06 RX ORDER — CEPHALEXIN 500 MG/1
500 CAPSULE ORAL EVERY 6 HOURS
Qty: 28 CAPSULE | Refills: 0 | Status: SHIPPED | OUTPATIENT
Start: 2025-02-06 | End: 2025-02-13

## 2025-02-06 NOTE — PROGRESS NOTES
"Postpartum Note    Chief Complaint: Postpartum Visit    HPI      Date of delivery: 22Jan25     The patient feels well. Patient describes her bleeding as no bleeding.     Breastfeeding: infant latching with difficulty with pain. Pumping as well     Incision is healing but some redness noted.     Review of Systems    The following portions of the patient's history were reviewed and updated as appropriate: allergies, current medications and problem list.             /88   Ht 163.8 cm (64.5\")   Wt 59.9 kg (132 lb)   LMP 04/22/2024 (Exact Date) Comment: Breast and Bottle  Breastfeeding Yes   BMI 22.31 kg/m²       Physical Exam  Constitutional:       Appearance: Normal appearance.   Cardiovascular:      Rate and Rhythm: Normal rate and regular rhythm.   Pulmonary:      Effort: Pulmonary effort is normal.      Breath sounds: Normal breath sounds.   Abdominal:      General: Abdomen is flat.      Palpations: Abdomen is soft.          Comments: Healing Pfannenstiel; mild erythema    Skin:     General: Skin is warm and dry.   Neurological:      General: No focal deficit present.      Mental Status: She is alert and oriented to person, place, and time.   Psychiatric:         Mood and Affect: Mood normal.         Behavior: Behavior normal.                       1) PPD#~ 2 weeks : Progressing well.     2) Postpartum Care:   Subutex 8mg bid,   h/o CHTN  B/P WNL labetalol 100mg bid     3) Gyn HM: July 2024 NILM co test neg     4) Contraception: Unsure     5) FU 4 weeks           Matheus Duarte,    2/6/2025  14:04 EST     "

## 2025-03-06 ENCOUNTER — POSTPARTUM VISIT (OUTPATIENT)
Dept: OBSTETRICS AND GYNECOLOGY | Facility: CLINIC | Age: 35
End: 2025-03-06
Payer: COMMERCIAL

## 2025-03-06 VITALS
SYSTOLIC BLOOD PRESSURE: 124 MMHG | BODY MASS INDEX: 22.64 KG/M2 | HEIGHT: 64 IN | DIASTOLIC BLOOD PRESSURE: 74 MMHG | WEIGHT: 132.6 LBS

## 2025-03-06 DIAGNOSIS — Z98.891 S/P CESAREAN SECTION: Primary | ICD-10-CM

## 2025-03-06 DIAGNOSIS — F17.210 CIGARETTE SMOKER: ICD-10-CM

## 2025-03-06 DIAGNOSIS — Z98.891 STATUS POST REPEAT LOW TRANSVERSE CESAREAN SECTION: ICD-10-CM

## 2025-03-06 PROBLEM — O99.320 PREGNANCY COMPLICATED BY SUBUTEX MAINTENANCE, ANTEPARTUM: Status: RESOLVED | Noted: 2021-11-15 | Resolved: 2025-03-06

## 2025-03-06 PROBLEM — R82.5 POSITIVE URINE DRUG SCREEN: Status: RESOLVED | Noted: 2024-07-12 | Resolved: 2025-03-06

## 2025-03-06 PROBLEM — Z87.59 HISTORY OF GESTATIONAL HYPERTENSION: Status: RESOLVED | Noted: 2024-08-11 | Resolved: 2025-03-06

## 2025-03-06 PROBLEM — O09.523 MULTIGRAVIDA OF ADVANCED MATERNAL AGE IN THIRD TRIMESTER: Status: RESOLVED | Noted: 2024-06-25 | Resolved: 2025-03-06

## 2025-03-06 PROBLEM — O43.109 PLACENTAL ABNORMALITY, ANTEPARTUM: Status: RESOLVED | Noted: 2024-10-14 | Resolved: 2025-03-06

## 2025-03-06 PROBLEM — F11.20 PREGNANCY COMPLICATED BY SUBUTEX MAINTENANCE, ANTEPARTUM: Status: RESOLVED | Noted: 2021-11-15 | Resolved: 2025-03-06

## 2025-03-06 NOTE — PROGRESS NOTES
Postpartum Note    Chief Complaint: Postpartum Visit    HPI      Date of delivery:  ( RLTCS)     The patient feels well. Patient describes her bleeding as no bleeding.     Breastfeeding: infant latching with difficulty with pain. Pumping as well     Incision feels better     Review of Systems    The following portions of the patient's history were reviewed and updated as appropriate: allergies, current medications and problem list.             LMP 2024 (Exact Date) Comment: Breast and Bottle    Vitals:    25 1442   BP: 124/74          Physical Exam  Constitutional:       Appearance: Normal appearance.   Cardiovascular:      Rate and Rhythm: Normal rate and regular rhythm.   Pulmonary:      Effort: Pulmonary effort is normal.      Breath sounds: Normal breath sounds.   Abdominal:      General: Abdomen is flat.      Palpations: Abdomen is soft.          Comments: Healed Pfannenstiel; small 1mm Right lateral; almost closed    Skin:     General: Skin is warm and dry.   Neurological:      General: No focal deficit present.      Mental Status: She is alert and oriented to person, place, and time.   Psychiatric:         Mood and Affect: Mood normal.         Behavior: Behavior normal.                       1) PPD#~ 6 weeks : Progressing well.     2) Postpartum Care:   Subutex 8mg bid,   h/o CHTN  B/P WNL labetalol 100mg bid          Postpartum Depression: Low Risk  (3/6/2025)    [place]  Depression Scale     Last EPDS Total Score: 2     Last EPDS Self Harm Result: Never        3) Gyn HM: 2024 NILM co test neg     4) Contraception: NFP    5) FU 1 year Annual            Matheus Duarte DO   3/6/2025  14:42 EST

## 2025-04-10 ENCOUNTER — OFFICE VISIT (OUTPATIENT)
Dept: OBSTETRICS AND GYNECOLOGY | Facility: CLINIC | Age: 35
End: 2025-04-10
Payer: MEDICAID

## 2025-04-10 VITALS
DIASTOLIC BLOOD PRESSURE: 88 MMHG | WEIGHT: 131 LBS | HEIGHT: 64 IN | SYSTOLIC BLOOD PRESSURE: 128 MMHG | BODY MASS INDEX: 22.36 KG/M2

## 2025-04-10 DIAGNOSIS — Z98.891 STATUS POST REPEAT LOW TRANSVERSE CESAREAN SECTION: ICD-10-CM

## 2025-04-10 DIAGNOSIS — F17.210 CIGARETTE SMOKER: ICD-10-CM

## 2025-04-10 DIAGNOSIS — Z13.89 SCREENING FOR GENITOURINARY CONDITION: Primary | ICD-10-CM

## 2025-04-10 RX ORDER — CEPHALEXIN 500 MG/1
500 CAPSULE ORAL EVERY 6 HOURS
Qty: 28 CAPSULE | Refills: 0 | Status: SHIPPED | OUTPATIENT
Start: 2025-04-10 | End: 2025-04-17

## 2025-04-10 NOTE — PROGRESS NOTES
34 yo  here today for incision check.  Last week was wearing tight garments and felt there was a small opening in Pfannenstiel that did not heal well ( right of midline). No Fever, pus or foul smelling discharge.      RLTCS       PMH:   Past Medical History:   Diagnosis Date    HPV (human papilloma virus) infection 2008    LGSIL on Pap smear of cervix 2008    Migraine     Ovarian cyst                 PSH:     Past Surgical History:   Procedure Laterality Date     SECTION N/A 2022    Procedure:  SECTION PRIMARY;  Surgeon: Milana Thompson DO;  Location: [facility] LABOR DELIVERY;  Service: Obstetrics/Gynecology;  Laterality: N/A;     SECTION N/A 2025    Procedure:  SECTION REPEAT;  Surgeon: Matheus Duarte DO;  Location: [facility] LABOR DELIVERY;  Service: Obstetrics;  Laterality: N/A;             Social hx:   Social History     Socioeconomic History    Marital status:    Tobacco Use    Smoking status: Every Day     Current packs/day: 0.50     Types: Cigarettes     Passive exposure: Current    Smokeless tobacco: Never   Vaping Use    Vaping status: Never Used   Substance and Sexual Activity    Alcohol use: No    Drug use: Not Currently     Types: Opium     Comment: stopped using in - started meds 2014    Sexual activity: Yes     Partners: Male        [  X ] no h/o abuse/DV:                  Allergies:       Allergies   Allergen Reactions    Benadryl [Diphenhydramine] Other (See Comments)     Altered mental state in childhood                  Meds:   Current Outpatient Medications:     buprenorphine (SUBUTEX) 8 MG sublingual tablet SL tablet, Place 1 tablet under the tongue 2 (Two) Times a Day., Disp: , Rfl:     cephalexin (KEFLEX) 500 MG capsule, Take 1 capsule by mouth Every 6 (Six) Hours for 7 days., Disp: 28 capsule, Rfl: 0    gabapentin (NEURONTIN) 800 MG tablet, Take 1 tablet by mouth 3 (Three) Times a Day., Disp: , Rfl:      Prenatal Vit-Fe Fumarate-FA (prenatal vitamin 27-0.8) 27-0.8 MG tablet tablet, Take 1 tablet by mouth Daily., Disp: , Rfl:     SUMAtriptan (IMITREX) 50 MG tablet, Take 1 tablet by mouth., Disp: , Rfl:      Review of Systems   See HPI     Vitals:    04/10/25 1410   BP: 128/88        Physical Exam  Constitutional:       Appearance: Normal appearance.   Cardiovascular:      Rate and Rhythm: Normal rate.   Pulmonary:      Breath sounds: Normal breath sounds.   Abdominal:      General: Abdomen is flat.      Palpations: Abdomen is soft.          Comments: Small < 0.1 cm opening. Unable to probe with q tip or surgical tool; express serosanguinous fluid    Neurological:      General: No focal deficit present.      Mental Status: She is alert and oriented to person, place, and time.   Skin:     General: Skin is warm and dry.   Psychiatric:         Mood and Affect: Mood normal.         Behavior: Behavior normal.          Diagnoses and all orders for this visit:    1. Screening for genitourinary condition (Primary)  -     POC Pregnancy, Urine  -     POC Urinalysis Dipstick    2. Cigarette smoker  Overview:  Smokes 1/2 ppd.  Aware she needs to quit.  Given RX for Nicoderm but hasn't started yet.       3. Status post repeat low transverse  section    Other orders  -     cephalexin (KEFLEX) 500 MG capsule; Take 1 capsule by mouth Every 6 (Six) Hours for 7 days.  Dispense: 28 capsule; Refill: 0     Will utilize wound treatment   Abx   F/u 1 week; may need wound vacuum or wound care   Recommend smoking cessation   Ensure protein daily to assist with nutrition  BF infant       Matheus Duarte DO  04/10/2025    15:30 EDT

## (undated) DEVICE — SUT VIC PLSTC UD PS2 4/0 27IN J426

## (undated) DEVICE — APPL CHLORAPREP HI/LITE 26ML ORNG

## (undated) DEVICE — SOL IRR H2O BTL 1000ML STRL

## (undated) DEVICE — ANTIBACTERIAL UNDYED BRAIDED (POLYGLACTIN 910), SYNTHETIC ABSORBABLE SUTURE: Brand: COATED VICRYL

## (undated) DEVICE — TRY SKINPREP DRYPREP

## (undated) DEVICE — PREP SOL POVIDONE/IODINE BT 4OZ

## (undated) DEVICE — APPL CHLORAPREP W/TINT 26ML ORNG

## (undated) DEVICE — SUT VIC 0 CTX 36IN J978H

## (undated) DEVICE — HYDROGEL COATED LATEX URINE METER FOLEY TRAY,16 FR/CH (5.3 MM), 5 ML CATHETER PRE-CONNECTED TO 2000 ML DRAINAGE BAG WITH NEEDLE SAMPLING: Brand: DOVER

## (undated) DEVICE — LINER SURG CANSTR SXN S/RIGD 1500CC

## (undated) DEVICE — GLV SURG SENSICARE W/ALOE PF LF 7.5 STRL

## (undated) DEVICE — GLV SURG NEOLON 2G PF LF 6.5 STRL

## (undated) DEVICE — PK C/SECT 40

## (undated) DEVICE — SOL IRR H2O BO 1000ML STRL

## (undated) DEVICE — LARGE, DISPOSABLE ALEXIS O C-SECTION PROTECTOR - RETRACTOR: Brand: ALEXIS ® O C-SECTION PROTECTOR - RETRACTOR

## (undated) DEVICE — SUCTION CANISTER, 1000CC,SAFELINER: Brand: DEROYAL